# Patient Record
Sex: FEMALE | Race: WHITE | NOT HISPANIC OR LATINO | Employment: FULL TIME | URBAN - METROPOLITAN AREA
[De-identification: names, ages, dates, MRNs, and addresses within clinical notes are randomized per-mention and may not be internally consistent; named-entity substitution may affect disease eponyms.]

---

## 2023-07-21 ENCOUNTER — APPOINTMENT (OUTPATIENT)
Dept: CT IMAGING | Facility: HOSPITAL | Age: 28
End: 2023-07-21
Payer: COMMERCIAL

## 2023-07-21 ENCOUNTER — APPOINTMENT (OUTPATIENT)
Dept: GENERAL RADIOLOGY | Facility: HOSPITAL | Age: 28
End: 2023-07-21
Payer: COMMERCIAL

## 2023-07-21 ENCOUNTER — HOSPITAL ENCOUNTER (OUTPATIENT)
Facility: HOSPITAL | Age: 28
Discharge: LEFT BEFORE DISCHARGE | End: 2023-07-22
Attending: EMERGENCY MEDICINE | Admitting: INTERNAL MEDICINE
Payer: COMMERCIAL

## 2023-07-21 DIAGNOSIS — R00.0 SINUS TACHYCARDIA: ICD-10-CM

## 2023-07-21 DIAGNOSIS — R41.0 ACUTE DELIRIUM: ICD-10-CM

## 2023-07-21 DIAGNOSIS — T50.904A POLYSUBSTANCE OVERDOSE, UNDETERMINED INTENT, INITIAL ENCOUNTER: Primary | ICD-10-CM

## 2023-07-21 PROBLEM — T50.901A DRUG OVERDOSE: Status: ACTIVE | Noted: 2023-07-21

## 2023-07-21 PROBLEM — D72.829 LEUKOCYTOSIS: Status: ACTIVE | Noted: 2023-07-21

## 2023-07-21 PROBLEM — F19.10 POLYSUBSTANCE ABUSE: Status: ACTIVE | Noted: 2023-07-21

## 2023-07-21 PROBLEM — R41.82 AMS (ALTERED MENTAL STATUS): Status: ACTIVE | Noted: 2023-07-21

## 2023-07-21 PROBLEM — F43.10 PTSD (POST-TRAUMATIC STRESS DISORDER): Status: ACTIVE | Noted: 2023-07-21

## 2023-07-21 PROBLEM — F41.9 ANXIETY: Status: ACTIVE | Noted: 2023-07-21

## 2023-07-21 PROBLEM — E87.29 METABOLIC ACIDOSIS, INCREASED ANION GAP: Status: ACTIVE | Noted: 2023-07-21

## 2023-07-21 PROBLEM — E83.52 HYPERCALCEMIA: Status: ACTIVE | Noted: 2023-07-21

## 2023-07-21 LAB
ALBUMIN SERPL-MCNC: 5.8 G/DL (ref 3.5–5.2)
ALBUMIN/GLOB SERPL: 2 G/DL
ALP SERPL-CCNC: 95 U/L (ref 39–117)
ALT SERPL W P-5'-P-CCNC: 28 U/L (ref 1–33)
AMPHET+METHAMPHET UR QL: NEGATIVE
ANION GAP SERPL CALCULATED.3IONS-SCNC: 14.9 MMOL/L (ref 5–15)
ANION GAP SERPL CALCULATED.3IONS-SCNC: 22 MMOL/L (ref 5–15)
APAP SERPL-MCNC: <5 MCG/ML (ref 0–30)
AST SERPL-CCNC: 41 U/L (ref 1–32)
BARBITURATES UR QL SCN: NEGATIVE
BASOPHILS # BLD AUTO: 0.04 10*3/MM3 (ref 0–0.2)
BASOPHILS NFR BLD AUTO: 0.4 % (ref 0–1.5)
BENZODIAZ UR QL SCN: NEGATIVE
BILIRUB SERPL-MCNC: 0.4 MG/DL (ref 0–1.2)
BUN SERPL-MCNC: 14 MG/DL (ref 6–20)
BUN SERPL-MCNC: 16 MG/DL (ref 6–20)
BUN/CREAT SERPL: 17.1 (ref 7–25)
BUN/CREAT SERPL: 22.5 (ref 7–25)
CALCIUM SPEC-SCNC: 10.2 MG/DL (ref 8.6–10.5)
CALCIUM SPEC-SCNC: 11.5 MG/DL (ref 8.6–10.5)
CANNABINOIDS SERPL QL: NEGATIVE
CHLORIDE SERPL-SCNC: 103 MMOL/L (ref 98–107)
CHLORIDE SERPL-SCNC: 99 MMOL/L (ref 98–107)
CO2 SERPL-SCNC: 20 MMOL/L (ref 22–29)
CO2 SERPL-SCNC: 24.1 MMOL/L (ref 22–29)
COCAINE UR QL: POSITIVE
CREAT SERPL-MCNC: 0.71 MG/DL (ref 0.57–1)
CREAT SERPL-MCNC: 0.82 MG/DL (ref 0.57–1)
DEPRECATED RDW RBC AUTO: 39.6 FL (ref 37–54)
DEPRECATED RDW RBC AUTO: 42.9 FL (ref 37–54)
EGFRCR SERPLBLD CKD-EPI 2021: 101.3 ML/MIN/1.73
EGFRCR SERPLBLD CKD-EPI 2021: 120.4 ML/MIN/1.73
EOSINOPHIL # BLD AUTO: 0.17 10*3/MM3 (ref 0–0.4)
EOSINOPHIL NFR BLD AUTO: 1.6 % (ref 0.3–6.2)
ERYTHROCYTE [DISTWIDTH] IN BLOOD BY AUTOMATED COUNT: 12.9 % (ref 12.3–15.4)
ERYTHROCYTE [DISTWIDTH] IN BLOOD BY AUTOMATED COUNT: 13.2 % (ref 12.3–15.4)
ETHANOL BLD-MCNC: <10 MG/DL (ref 0–10)
ETHANOL UR QL: <0.01 %
FENTANYL UR-MCNC: POSITIVE NG/ML
GLOBULIN UR ELPH-MCNC: 2.9 GM/DL
GLUCOSE SERPL-MCNC: 123 MG/DL (ref 65–99)
GLUCOSE SERPL-MCNC: 96 MG/DL (ref 65–99)
HCG SERPL QL: NEGATIVE
HCT VFR BLD AUTO: 42.8 % (ref 34–46.6)
HCT VFR BLD AUTO: 45.6 % (ref 34–46.6)
HGB BLD-MCNC: 14.3 G/DL (ref 12–15.9)
HGB BLD-MCNC: 15.3 G/DL (ref 12–15.9)
IMM GRANULOCYTES # BLD AUTO: 0.01 10*3/MM3 (ref 0–0.05)
IMM GRANULOCYTES NFR BLD AUTO: 0.1 % (ref 0–0.5)
LYMPHOCYTES # BLD AUTO: 3.52 10*3/MM3 (ref 0.7–3.1)
LYMPHOCYTES NFR BLD AUTO: 34.1 % (ref 19.6–45.3)
MCH RBC QN AUTO: 28.5 PG (ref 26.6–33)
MCH RBC QN AUTO: 29.3 PG (ref 26.6–33)
MCHC RBC AUTO-ENTMCNC: 33.4 G/DL (ref 31.5–35.7)
MCHC RBC AUTO-ENTMCNC: 33.6 G/DL (ref 31.5–35.7)
MCV RBC AUTO: 85.3 FL (ref 79–97)
MCV RBC AUTO: 87.4 FL (ref 79–97)
METHADONE UR QL SCN: NEGATIVE
MONOCYTES # BLD AUTO: 0.82 10*3/MM3 (ref 0.1–0.9)
MONOCYTES NFR BLD AUTO: 8 % (ref 5–12)
NEUTROPHILS NFR BLD AUTO: 5.75 10*3/MM3 (ref 1.7–7)
NEUTROPHILS NFR BLD AUTO: 55.8 % (ref 42.7–76)
NRBC BLD AUTO-RTO: 0 /100 WBC (ref 0–0.2)
OPIATES UR QL: NEGATIVE
OXYCODONE UR QL SCN: NEGATIVE
PLATELET # BLD AUTO: 330 10*3/MM3 (ref 140–450)
PLATELET # BLD AUTO: 391 10*3/MM3 (ref 140–450)
PMV BLD AUTO: 8.9 FL (ref 6–12)
PMV BLD AUTO: 9 FL (ref 6–12)
POTASSIUM SERPL-SCNC: 3.6 MMOL/L (ref 3.5–5.2)
POTASSIUM SERPL-SCNC: 3.8 MMOL/L (ref 3.5–5.2)
PROCALCITONIN SERPL-MCNC: 0.2 NG/ML (ref 0–0.25)
PROT SERPL-MCNC: 8.7 G/DL (ref 6–8.5)
QT INTERVAL: 317 MS
RBC # BLD AUTO: 5.02 10*6/MM3 (ref 3.77–5.28)
RBC # BLD AUTO: 5.22 10*6/MM3 (ref 3.77–5.28)
SALICYLATES SERPL-MCNC: <0.3 MG/DL
SODIUM SERPL-SCNC: 141 MMOL/L (ref 136–145)
SODIUM SERPL-SCNC: 142 MMOL/L (ref 136–145)
WBC NRBC COR # BLD: 10.31 10*3/MM3 (ref 3.4–10.8)
WBC NRBC COR # BLD: 15.04 10*3/MM3 (ref 3.4–10.8)

## 2023-07-21 PROCEDURE — 25010000002 LORAZEPAM PER 2 MG: Performed by: EMERGENCY MEDICINE

## 2023-07-21 PROCEDURE — 84703 CHORIONIC GONADOTROPIN ASSAY: CPT | Performed by: EMERGENCY MEDICINE

## 2023-07-21 PROCEDURE — 96361 HYDRATE IV INFUSION ADD-ON: CPT

## 2023-07-21 PROCEDURE — 93005 ELECTROCARDIOGRAM TRACING: CPT | Performed by: NURSE PRACTITIONER

## 2023-07-21 PROCEDURE — 80307 DRUG TEST PRSMV CHEM ANLYZR: CPT | Performed by: EMERGENCY MEDICINE

## 2023-07-21 PROCEDURE — 71045 X-RAY EXAM CHEST 1 VIEW: CPT

## 2023-07-21 PROCEDURE — 96376 TX/PRO/DX INJ SAME DRUG ADON: CPT

## 2023-07-21 PROCEDURE — 70450 CT HEAD/BRAIN W/O DYE: CPT

## 2023-07-21 PROCEDURE — 96375 TX/PRO/DX INJ NEW DRUG ADDON: CPT

## 2023-07-21 PROCEDURE — 85025 COMPLETE CBC W/AUTO DIFF WBC: CPT | Performed by: EMERGENCY MEDICINE

## 2023-07-21 PROCEDURE — 36415 COLL VENOUS BLD VENIPUNCTURE: CPT | Performed by: NURSE PRACTITIONER

## 2023-07-21 PROCEDURE — 25010000002 ONDANSETRON PER 1 MG: Performed by: EMERGENCY MEDICINE

## 2023-07-21 PROCEDURE — 80053 COMPREHEN METABOLIC PANEL: CPT | Performed by: EMERGENCY MEDICINE

## 2023-07-21 PROCEDURE — 63710000001 ONDANSETRON PER 8 MG: Performed by: NURSE PRACTITIONER

## 2023-07-21 PROCEDURE — 96374 THER/PROPH/DIAG INJ IV PUSH: CPT

## 2023-07-21 PROCEDURE — 25010000002 DROPERIDOL PER 5 MG: Performed by: EMERGENCY MEDICINE

## 2023-07-21 PROCEDURE — 80143 DRUG ASSAY ACETAMINOPHEN: CPT | Performed by: EMERGENCY MEDICINE

## 2023-07-21 PROCEDURE — 84145 PROCALCITONIN (PCT): CPT | Performed by: NURSE PRACTITIONER

## 2023-07-21 PROCEDURE — 85027 COMPLETE CBC AUTOMATED: CPT | Performed by: NURSE PRACTITIONER

## 2023-07-21 PROCEDURE — 99285 EMERGENCY DEPT VISIT HI MDM: CPT

## 2023-07-21 PROCEDURE — 80179 DRUG ASSAY SALICYLATE: CPT | Performed by: EMERGENCY MEDICINE

## 2023-07-21 PROCEDURE — 82077 ASSAY SPEC XCP UR&BREATH IA: CPT | Performed by: EMERGENCY MEDICINE

## 2023-07-21 PROCEDURE — 93010 ELECTROCARDIOGRAM REPORT: CPT | Performed by: INTERNAL MEDICINE

## 2023-07-21 RX ORDER — ONDANSETRON 4 MG/1
4 TABLET, FILM COATED ORAL EVERY 6 HOURS PRN
Status: DISCONTINUED | OUTPATIENT
Start: 2023-07-21 | End: 2023-07-22 | Stop reason: HOSPADM

## 2023-07-21 RX ORDER — ONDANSETRON 2 MG/ML
4 INJECTION INTRAMUSCULAR; INTRAVENOUS ONCE
Status: COMPLETED | OUTPATIENT
Start: 2023-07-21 | End: 2023-07-21

## 2023-07-21 RX ORDER — NITROGLYCERIN 0.4 MG/1
0.4 TABLET SUBLINGUAL
Status: DISCONTINUED | OUTPATIENT
Start: 2023-07-21 | End: 2023-07-22 | Stop reason: HOSPADM

## 2023-07-21 RX ORDER — CLONIDINE HYDROCHLORIDE 0.1 MG/1
0.1 TABLET ORAL 2 TIMES DAILY PRN
Status: DISCONTINUED | OUTPATIENT
Start: 2023-07-24 | End: 2023-07-22 | Stop reason: HOSPADM

## 2023-07-21 RX ORDER — SODIUM CHLORIDE 9 MG/ML
100 INJECTION, SOLUTION INTRAVENOUS CONTINUOUS
Status: DISCONTINUED | OUTPATIENT
Start: 2023-07-21 | End: 2023-07-22

## 2023-07-21 RX ORDER — CLONIDINE HYDROCHLORIDE 0.1 MG/1
0.1 TABLET ORAL 4 TIMES DAILY PRN
Status: ACTIVE | OUTPATIENT
Start: 2023-07-21 | End: 2023-07-22

## 2023-07-21 RX ORDER — CLONIDINE HYDROCHLORIDE 0.1 MG/1
0.1 TABLET ORAL ONCE AS NEEDED
Status: DISCONTINUED | OUTPATIENT
Start: 2023-07-25 | End: 2023-07-22 | Stop reason: HOSPADM

## 2023-07-21 RX ORDER — DROPERIDOL 2.5 MG/ML
5 INJECTION, SOLUTION INTRAMUSCULAR; INTRAVENOUS ONCE
Status: COMPLETED | OUTPATIENT
Start: 2023-07-21 | End: 2023-07-21

## 2023-07-21 RX ORDER — SODIUM CHLORIDE 0.9 % (FLUSH) 0.9 %
10 SYRINGE (ML) INJECTION EVERY 12 HOURS SCHEDULED
Status: DISCONTINUED | OUTPATIENT
Start: 2023-07-21 | End: 2023-07-22 | Stop reason: HOSPADM

## 2023-07-21 RX ORDER — CALCIUM CARBONATE 500 MG/1
2 TABLET, CHEWABLE ORAL 2 TIMES DAILY PRN
Status: DISCONTINUED | OUTPATIENT
Start: 2023-07-21 | End: 2023-07-22 | Stop reason: HOSPADM

## 2023-07-21 RX ORDER — CLONIDINE HYDROCHLORIDE 0.1 MG/1
0.1 TABLET ORAL 4 TIMES DAILY PRN
Status: DISCONTINUED | OUTPATIENT
Start: 2023-07-22 | End: 2023-07-22 | Stop reason: HOSPADM

## 2023-07-21 RX ORDER — HYDROXYZINE HYDROCHLORIDE 25 MG/1
50 TABLET, FILM COATED ORAL 3 TIMES DAILY PRN
Status: DISCONTINUED | OUTPATIENT
Start: 2023-07-21 | End: 2023-07-22 | Stop reason: HOSPADM

## 2023-07-21 RX ORDER — SODIUM CHLORIDE 9 MG/ML
40 INJECTION, SOLUTION INTRAVENOUS AS NEEDED
Status: DISCONTINUED | OUTPATIENT
Start: 2023-07-21 | End: 2023-07-22 | Stop reason: HOSPADM

## 2023-07-21 RX ORDER — SODIUM CHLORIDE 0.9 % (FLUSH) 0.9 %
10 SYRINGE (ML) INJECTION AS NEEDED
Status: DISCONTINUED | OUTPATIENT
Start: 2023-07-21 | End: 2023-07-22 | Stop reason: HOSPADM

## 2023-07-21 RX ORDER — ONDANSETRON 2 MG/ML
4 INJECTION INTRAMUSCULAR; INTRAVENOUS EVERY 6 HOURS PRN
Status: DISCONTINUED | OUTPATIENT
Start: 2023-07-21 | End: 2023-07-22 | Stop reason: HOSPADM

## 2023-07-21 RX ORDER — LORAZEPAM 2 MG/ML
2 INJECTION INTRAMUSCULAR ONCE
Status: COMPLETED | OUTPATIENT
Start: 2023-07-21 | End: 2023-07-21

## 2023-07-21 RX ORDER — LORAZEPAM 2 MG/ML
1 INJECTION INTRAMUSCULAR ONCE
Status: COMPLETED | OUTPATIENT
Start: 2023-07-21 | End: 2023-07-21

## 2023-07-21 RX ORDER — CLONIDINE HYDROCHLORIDE 0.1 MG/1
0.1 TABLET ORAL 3 TIMES DAILY PRN
Status: DISCONTINUED | OUTPATIENT
Start: 2023-07-23 | End: 2023-07-22 | Stop reason: HOSPADM

## 2023-07-21 RX ADMIN — SODIUM CHLORIDE 100 ML/HR: 9 INJECTION, SOLUTION INTRAVENOUS at 05:44

## 2023-07-21 RX ADMIN — LORAZEPAM 1 MG: 2 INJECTION INTRAMUSCULAR; INTRAVENOUS at 05:41

## 2023-07-21 RX ADMIN — SODIUM CHLORIDE 100 ML/HR: 9 INJECTION, SOLUTION INTRAVENOUS at 05:41

## 2023-07-21 RX ADMIN — DROPERIDOL 5 MG: 2.5 INJECTION, SOLUTION INTRAMUSCULAR; INTRAVENOUS at 04:37

## 2023-07-21 RX ADMIN — HYDROXYZINE HYDROCHLORIDE 50 MG: 25 TABLET ORAL at 23:27

## 2023-07-21 RX ADMIN — Medication 10 ML: at 20:15

## 2023-07-21 RX ADMIN — LORAZEPAM 2 MG: 2 INJECTION INTRAMUSCULAR; INTRAVENOUS at 03:49

## 2023-07-21 RX ADMIN — SODIUM CHLORIDE 1000 ML: 9 INJECTION, SOLUTION INTRAVENOUS at 03:51

## 2023-07-21 RX ADMIN — HYDROXYZINE HYDROCHLORIDE 50 MG: 25 TABLET ORAL at 08:33

## 2023-07-21 RX ADMIN — ONDANSETRON HYDROCHLORIDE 4 MG: 4 TABLET, FILM COATED ORAL at 08:33

## 2023-07-21 RX ADMIN — SODIUM CHLORIDE 100 ML/HR: 9 INJECTION, SOLUTION INTRAVENOUS at 12:15

## 2023-07-21 RX ADMIN — ONDANSETRON 4 MG: 2 INJECTION INTRAMUSCULAR; INTRAVENOUS at 05:40

## 2023-07-21 NOTE — NURSING NOTE
[facility] attempted evaluation with patient. Patient has been sleeping/sedated throughout the day. Patient was agitated upon ED arrival and was medicated with Ativan and droperidol. On latest attempt the patient did not wake up to name. [facility] will attempt evaluation tomorrow.

## 2023-07-21 NOTE — ED NOTES
"Nursing report ED to floor  Nereida Thomas  27 y.o.  female    HPI :   Chief Complaint   Patient presents with    Altered Mental Status       Admitting doctor:   Dany Cain MD    Admitting diagnosis:   The primary encounter diagnosis was Polysubstance overdose, undetermined intent, initial encounter. Diagnoses of Acute delirium and Sinus tachycardia were also pertinent to this visit.    Code status:   Current Code Status       Date Active Code Status Order ID Comments User Context       7/21/2023 0506 CPR (Attempt to Resuscitate) [id number]  Jaylin Massey APRN ED        Question Answer    Code Status (Patient has no pulse and is not breathing) CPR (Attempt to Resuscitate)    Medical Interventions (Patient has pulse or is breathing) Full Support    Release to patient Routine Release                    Allergies:   Patient has no known allergies.    Isolation:   No active isolations    Intake and Output  No intake or output data in the 24 hours ending 07/21/23 0549    Weight:       07/21/23  0402   Weight: 52.2 kg (115 lb)       Most recent vitals:   Vitals:    07/21/23 0402 07/21/23 0451 07/21/23 0530 07/21/23 0533   BP:  (!) 101/36 (!) 134/110    Pulse:   94 100   Resp: 24      Temp: 99.4 °F (37.4 °C)      TempSrc: Tympanic      SpO2:   (!) 88% 97%   Weight: 52.2 kg (115 lb)      Height: 160 cm (63\")          Active LDAs/IV Access:   Lines, Drains & Airways       Active LDAs       Name Placement date Placement time Site Days    Peripheral IV 07/21/23 0346 Anterior;Left;Proximal Antecubital 07/21/23 0346  Antecubital  less than 1                    Labs (abnormal labs have a star):   Labs Reviewed   COMPREHENSIVE METABOLIC PANEL - Abnormal; Notable for the following components:       Result Value    CO2 20.0 (*)     Calcium 11.5 (*)     Total Protein 8.7 (*)     Albumin 5.8 (*)     AST (SGOT) 41 (*)     Anion Gap 22.0 (*)     All other components within normal limits    Narrative:     GFR Normal " >60  Chronic Kidney Disease <60  Kidney Failure <15     URINE DRUG SCREEN - Abnormal; Notable for the following components:    Cocaine Screen, Urine Positive (*)     Fentanyl, Urine Positive (*)     All other components within normal limits    Narrative:     Negative Thresholds Per Drugs Screened:    Amphetamines                 500 ng/ml  Barbiturates                 200 ng/ml  Benzodiazepines              100 ng/ml  Cocaine                      300 ng/ml  Methadone                    300 ng/ml  Opiates                      300 ng/ml  Oxycodone                    100 ng/ml  THC                           50 ng/ml  Fentanyl                       5 ng/ml      The Normal Value for all drugs tested is negative. This report includes final unconfirmed screening results to be used for medical treatment purposes only. Unconfirmed results must not be used for non-medical purposes such as employment or legal testing. Clinical consideration should be applied to any drug of abuse test, particularly when unconfirmed results are used.           CBC WITH AUTO DIFFERENTIAL - Abnormal; Notable for the following components:    Lymphocytes, Absolute 3.52 (*)     All other components within normal limits   HCG, SERUM, QUALITATIVE - Normal   ACETAMINOPHEN LEVEL - Normal   SALICYLATE LEVEL - Normal    Narrative:     Therapeutic range for Salicylates:  3.0 - 10.0 mg/dL for antipyretic/analgesic conditions  15.0 - 30.0 mg/dL for anti-inflammatory conditions   ETHANOL   BASIC METABOLIC PANEL   CBC (NO DIFF)   CBC AND DIFFERENTIAL    Narrative:     The following orders were created for panel order CBC & Differential.  Procedure                               Abnormality         Status                     ---------                               -----------         ------                     CBC Auto Differential[[id number]]        Abnormal            Final result                 Please view results for these tests on the individual orders.        EKG:   No orders to display       Meds given in ED:   Medications   sodium chloride 0.9 % flush 10 mL (has no administration in time range)   sodium chloride 0.9 % flush 10 mL (has no administration in time range)   sodium chloride 0.9 % flush 10 mL (has no administration in time range)   sodium chloride 0.9 % infusion 40 mL (has no administration in time range)   nitroglycerin (NITROSTAT) SL tablet 0.4 mg (has no administration in time range)   sodium chloride 0.9 % infusion (100 mL/hr Intravenous New Bag 7/21/23 0544)   ondansetron (ZOFRAN) tablet 4 mg (has no administration in time range)     Or   ondansetron (ZOFRAN) injection 4 mg (has no administration in time range)   calcium carbonate (TUMS) chewable tablet 500 mg (200 mg elemental) (has no administration in time range)   cloNIDine (CATAPRES) tablet 0.1 mg (has no administration in time range)     Followed by   cloNIDine (CATAPRES) tablet 0.1 mg (has no administration in time range)     Followed by   cloNIDine (CATAPRES) tablet 0.1 mg (has no administration in time range)     Followed by   cloNIDine (CATAPRES) tablet 0.1 mg (has no administration in time range)     Followed by   cloNIDine (CATAPRES) tablet 0.1 mg (has no administration in time range)   hydrOXYzine (ATARAX) tablet 50 mg (has no administration in time range)   sodium chloride 0.9 % bolus 1,000 mL (1,000 mL Intravenous New Bag 7/21/23 0351)   LORazepam (ATIVAN) injection 2 mg (2 mg Intravenous Given 7/21/23 0349)   droperidol (INAPSINE) injection 5 mg (5 mg Intravenous Given 7/21/23 0437)   LORazepam (ATIVAN) injection 1 mg (1 mg Intravenous Given 7/21/23 0541)   ondansetron (ZOFRAN) injection 4 mg (4 mg Intravenous Given 7/21/23 0540)       Imaging results:  CT Head Without Contrast    Result Date: 7/21/2023  Electronically signed by Ryan Valdovinos MD on 07-21-23 at 0508     Ambulatory status:   - bed rest    Social issues:   Social History     Socioeconomic History    Marital  status: Single       NIH Stroke Scale:       Miguel Bernal RN  07/21/23 05:49 EDT

## 2023-07-21 NOTE — CONSULTS
"Nutrition Services    Patient Name:  Nereida Thomas  YOB: 1995  MRN: [id number]  Admit Date:  7/21/2023    Assessment Date:  07/21/23    Comment: Visited patient at bedside who had arrived to the floor a few hours ago from ED. She was admitted for suspected drug overdose. Most recent COWS-12. She has not been awake enough to attempt to eat. Limited wt hx, but it seems to be stable. Her boyfriend was at bedside. Will continue to follow      CLINICAL NUTRITION ASSESSMENT      Reason for Assessment Nurse Admission Screen     Diagnosis/Problem   AMS, polysubstance abuse, drug overdose, anxiety, PTSD   Medical/Surgical History Past Medical History:   Diagnosis Date    Anxiety     Neck pain        Past Surgical History:   Procedure Laterality Date    NO PAST SURGERIES          Encounter Information        Nutrition History:     Food Preferences:    Supplements:    Factors Affecting Intake: altered mental status     Anthropometrics        Current Height  Current Weight  BMI kg/m2 Height: 160 cm (63\")  Weight: 52.2 kg (115 lb) (07/21/23 0402)  Body mass index is 20.37 kg/m².   Adjusted BMI (if applicable)    BMI Category Normal/Healthy (18.4 - 24.9)       Admission Weight 115# (52.2 kg)        Ideal Body Weight (IBW) 115#   Adjusted IBW (if applicable)        Usual Body Weight (UBW)    Weight Change/Trend Stable       Weight History Wt Readings from Last 30 Encounters:   07/21/23 0402 52.2 kg (115 lb)           --  Estimated/Assessed Needs        Current Weight  Weight: 52.2 kg (115 lb) (07/21/23 0402)       Energy Requirements    Weight for Calculation 115# (52.2 kg)    Method for Estimation  25 kcal/kg, 30 kcal/kg   EST Needs (kcal/day) 6972-1095       Protein Requirements    Weight for Calculation 115# (52.2 kg)    EST Protein Needs (g/kg) 1.0 - 1.2 gm/kg   EST Daily Needs (g/day) 52-63       Fluid Requirements     Method for Estimation 1 mL/kcal    EST Needs (mL/day)      Tests/Procedures        " Tests/Procedures CT scan, X-Ray     Labs       Pertinent Labs    Results from last 7 days   Lab Units 07/21/23  0826 07/21/23  0346   SODIUM mmol/L 142 141   POTASSIUM mmol/L 3.6 3.8   CHLORIDE mmol/L 103 99   CO2 mmol/L 24.1 20.0*   BUN mg/dL 16 14   CREATININE mg/dL 0.71 0.82   CALCIUM mg/dL 10.2 11.5*   BILIRUBIN mg/dL  --  0.4   ALK PHOS U/L  --  95   ALT (SGPT) U/L  --  28   AST (SGOT) U/L  --  41*   GLUCOSE mg/dL 123* 96     Results from last 7 days   Lab Units 07/21/23  0826 07/21/23  0346   HEMOGLOBIN g/dL 14.3 15.3   HEMATOCRIT % 42.8 45.6   WBC 10*3/mm3 15.04* 10.31   ALBUMIN g/dL  --  5.8*     Results from last 7 days   Lab Units 07/21/23  0826 07/21/23  0346   PLATELETS 10*3/mm3 330 391     No results found for: COVID19  No results found for: HGBA1C       Medications           Scheduled Medications sodium chloride, 10 mL, Intravenous, Q12H       Infusions sodium chloride, 100 mL/hr, Last Rate: 100 mL/hr (07/21/23 1215)       PRN Medications   calcium carbonate    cloNIDine **FOLLOWED BY** [START ON 7/22/2023] cloNIDine **FOLLOWED BY** [START ON 7/23/2023] cloNIDine **FOLLOWED BY** [START ON 7/24/2023] cloNIDine **FOLLOWED BY** [START ON 7/25/2023] cloNIDine    hydrOXYzine    nitroglycerin    ondansetron **OR** ondansetron    [COMPLETED] Insert Peripheral IV **AND** sodium chloride    sodium chloride    sodium chloride     Physical Findings          Physical Appearance disoriented, somnolent   Oral/Mouth Cavity WNL   Edema  no edema   Gastrointestinal last bowel movement: UTD   Skin  skin intact   Tubes/Drains/Lines none   NFPE Not indicated at this time   --  Current Nutrition Orders & Evaluation of Intake       Oral Nutrition     Food Allergies NKFA   Current PO Diet Diet: Regular/House Diet; Texture: Regular Texture (IDDSI 7); Fluid Consistency: Thin (IDDSI 0)   Supplement n/a   PO Evaluation     % PO Intake N/a    # of Days Evaluated N/a    --  PES STATEMENT / NUTRITION DIAGNOSIS      Nutrition Dx  Problem  Problem: Predicted Suboptimal Intake  Etiology: Medical Diagnosis AMS, drug overdose  Signs/Symptoms: Report/Observation    Comment:    --  NUTRITION INTERVENTION / PLAN OF CARE      Intervention Goal(s) Reduce/improve symptoms, Meet estimated needs, Tolerate PO , Increase intake, and Maintain weight         RD Intervention/Action Interview for preferences, Follow Tx Progress, and Care plan reviewed         Prescription/Orders:       PO Diet       Supplements       Snacks       Enteral Nutrition       Parenteral Nutrition    New Prescription Ordered? No changes at this time   --      Monitor/Evaluation Per protocol, I&O, PO intake, Pertinent labs, Weight, Skin status, GI status, Symptoms, POC/GOC   Discharge Plan/Needs No discharge needs identified at this time   Education Will instruct as appropriate   --    RD to follow per protocol.      Electronically signed by:  Malina Guthrie RD  07/21/23 14:54 EDT

## 2023-07-21 NOTE — ED PROVIDER NOTES
EMERGENCY DEPARTMENT ENCOUNTER    Room Number:  15/15  PCP: Provider, No Known  Historian: EMS report      HPI:  Chief Complaint: Drug overdose  A complete HPI/ROS/PMH/PSH/SH/FH are unobtainable due to: Altered mental state  Context: Nereida Thomas is a 27 y.o. female who presents to the ED today following a possible drug overdose.  EMS reports that the patient's boyfriend found her unresponsive on the side of the bed.  She was given intranasal Narcan with resolution in her unresponsive state.  Currently, the patient is highly agitated and having episodes of dry heaves as well as diarrhea.  She is currently not coherent and unable to contribute to the HPI.            PAST MEDICAL HISTORY  Active Ambulatory Problems     Diagnosis Date Noted    No Active Ambulatory Problems     Resolved Ambulatory Problems     Diagnosis Date Noted    No Resolved Ambulatory Problems     No Additional Past Medical History         PAST SURGICAL HISTORY  History reviewed. No pertinent surgical history.      FAMILY HISTORY  History reviewed. No pertinent family history.      SOCIAL HISTORY  Social History     Socioeconomic History    Marital status: Single         ALLERGIES  Patient has no known allergies.        REVIEW OF SYSTEMS  Review of Systems   Unable to perform ROS: Mental status change          PHYSICAL EXAM  ED Triage Vitals [07/21/23 0317]   Temp Heart Rate Resp BP SpO2   -- (!) 130 24 127/75 98 %      Temp src Heart Rate Source Patient Position BP Location FiO2 (%)   -- -- -- -- --       Physical Exam  Constitutional:       General: She is not in acute distress.     Appearance: Normal appearance. She is not ill-appearing or toxic-appearing.   HENT:      Head: Normocephalic and atraumatic.   Eyes:      Extraocular Movements: Extraocular movements intact.      Pupils: Pupils are equal, round, and reactive to light.   Cardiovascular:      Rate and Rhythm: Normal rate and regular rhythm.      Heart sounds: No murmur heard.    No  friction rub. No gallop.   Pulmonary:      Effort: Pulmonary effort is normal.      Breath sounds: Normal breath sounds.   Abdominal:      General: Abdomen is flat. There is no distension.      Palpations: Abdomen is soft.      Tenderness: There is no abdominal tenderness.   Musculoskeletal:         General: No swelling or tenderness. Normal range of motion.      Cervical back: Normal range of motion and neck supple.   Skin:     General: Skin is warm and dry.   Neurological:      General: No focal deficit present.      Mental Status: She is disoriented and confused.      Sensory: No sensory deficit.      Motor: No weakness.   Psychiatric:         Speech: She is noncommunicative.         Behavior: Behavior is agitated.         Vital signs and nursing notes reviewed.          LAB RESULTS  Recent Results (from the past 24 hour(s))   Comprehensive Metabolic Panel    Collection Time: 07/21/23  3:46 AM    Specimen: Blood   Result Value Ref Range    Glucose 96 65 - 99 mg/dL    BUN 14 6 - 20 mg/dL    Creatinine 0.82 0.57 - 1.00 mg/dL    Sodium 141 136 - 145 mmol/L    Potassium 3.8 3.5 - 5.2 mmol/L    Chloride 99 98 - 107 mmol/L    CO2 20.0 (L) 22.0 - 29.0 mmol/L    Calcium 11.5 (H) 8.6 - 10.5 mg/dL    Total Protein 8.7 (H) 6.0 - 8.5 g/dL    Albumin 5.8 (H) 3.5 - 5.2 g/dL    ALT (SGPT) 28 1 - 33 U/L    AST (SGOT) 41 (H) 1 - 32 U/L    Alkaline Phosphatase 95 39 - 117 U/L    Total Bilirubin 0.4 0.0 - 1.2 mg/dL    Globulin 2.9 gm/dL    A/G Ratio 2.0 g/dL    BUN/Creatinine Ratio 17.1 7.0 - 25.0    Anion Gap 22.0 (H) 5.0 - 15.0 mmol/L    eGFR 101.3 >60.0 mL/min/1.73   hCG, Serum, Qualitative    Collection Time: 07/21/23  3:46 AM    Specimen: Blood   Result Value Ref Range    HCG Qualitative Negative Negative   Ethanol    Collection Time: 07/21/23  3:46 AM    Specimen: Blood   Result Value Ref Range    Ethanol <10 0 - 10 mg/dL    Ethanol % <0.010 %   Acetaminophen Level    Collection Time: 07/21/23  3:46 AM    Specimen: Blood    Result Value Ref Range    Acetaminophen <5.0 0.0 - 30.0 mcg/mL   Salicylate Level    Collection Time: 07/21/23  3:46 AM    Specimen: Blood   Result Value Ref Range    Salicylate <0.3 <=30.0 mg/dL   CBC Auto Differential    Collection Time: 07/21/23  3:46 AM    Specimen: Blood   Result Value Ref Range    WBC 10.31 3.40 - 10.80 10*3/mm3    RBC 5.22 3.77 - 5.28 10*6/mm3    Hemoglobin 15.3 12.0 - 15.9 g/dL    Hematocrit 45.6 34.0 - 46.6 %    MCV 87.4 79.0 - 97.0 fL    MCH 29.3 26.6 - 33.0 pg    MCHC 33.6 31.5 - 35.7 g/dL    RDW 13.2 12.3 - 15.4 %    RDW-SD 42.9 37.0 - 54.0 fl    MPV 8.9 6.0 - 12.0 fL    Platelets 391 140 - 450 10*3/mm3    Neutrophil % 55.8 42.7 - 76.0 %    Lymphocyte % 34.1 19.6 - 45.3 %    Monocyte % 8.0 5.0 - 12.0 %    Eosinophil % 1.6 0.3 - 6.2 %    Basophil % 0.4 0.0 - 1.5 %    Immature Grans % 0.1 0.0 - 0.5 %    Neutrophils, Absolute 5.75 1.70 - 7.00 10*3/mm3    Lymphocytes, Absolute 3.52 (H) 0.70 - 3.10 10*3/mm3    Monocytes, Absolute 0.82 0.10 - 0.90 10*3/mm3    Eosinophils, Absolute 0.17 0.00 - 0.40 10*3/mm3    Basophils, Absolute 0.04 0.00 - 0.20 10*3/mm3    Immature Grans, Absolute 0.01 0.00 - 0.05 10*3/mm3    nRBC 0.0 0.0 - 0.2 /100 WBC   Urine Drug Screen - Urine, Clean Catch    Collection Time: 07/21/23  3:47 AM    Specimen: Urine, Clean Catch   Result Value Ref Range    Amphet/Methamphet, Screen Negative Negative    Barbiturates Screen, Urine Negative Negative    Benzodiazepine Screen, Urine Negative Negative    Cocaine Screen, Urine Positive (A) Negative    Opiate Screen Negative Negative    THC, Screen, Urine Negative Negative    Methadone Screen, Urine Negative Negative    Oxycodone Screen, Urine Negative Negative    Fentanyl, Urine Positive (A) Negative       Ordered the above labs and reviewed the results.        RADIOLOGY  CT Head Without Contrast    Result Date: 7/21/2023  Patient: KARO BELTRAN  Time Out: 05:08 Exam(s): CT HEAD Without Contrast EXAM:   CT Head Without Intravenous  Contrast CLINICAL HISTORY:    Reason for exam: Delirium. TECHNIQUE:   Axial computed tomography images of the head brain without intravenous contrast.  CTDI is 55.44 mGy and DLP is 980 mGy-cm.  This CT exam was performed according to the principle of ALARA (As Low As Reasonably Achievable) by using one or more of the following dose reduction techniques: automated exposure control, adjustment of the mA and or kV according to patient size, and or use of iterative reconstruction technique. COMPARISON:   No relevant prior studies available. FINDINGS:   Artifacts:  Motion artifact degrades image quality limits evaluation.   Brain:  There is no evidence of an acute transcortical infarct or acute intracranial hemorrhage.  No significant white matter disease.   Ventricles:  Unremarkable.  No ventriculomegaly.   Bones joints:  Unremarkable.  No acute fracture.   Soft tissues:  Unremarkable.   Sinuses:  Unremarkable as visualized.  No acute sinusitis.   Mastoid air cells:  Unremarkable as visualized.  No mastoid effusion. IMPRESSION:       No acute findings in the head brain.     Electronically signed by Ryan Valdovinos MD on 07-21-23 at 0508     Ordered the above noted radiological studies. Reviewed by me in PACS.            PROCEDURES  Critical Care  Performed by: Nick Corral MD  Authorized by: Nick Corral MD     Critical care provider statement:     Critical care time (minutes):  32    Critical care time was exclusive of:  Separately billable procedures and treating other patients    Critical care was necessary to treat or prevent imminent or life-threatening deterioration of the following conditions:  Toxidrome    Critical care was time spent personally by me on the following activities:  Blood draw for specimens, development of treatment plan with patient or surrogate, discussions with consultants, evaluation of patient's response to treatment, examination of patient, obtaining history from patient  or surrogate, ordering and performing treatments and interventions, ordering and review of laboratory studies, ordering and review of radiographic studies, pulse oximetry and re-evaluation of patient's condition    Care discussed with: admitting provider              MEDICATIONS GIVEN IN ER  Medications   sodium chloride 0.9 % flush 10 mL (has no administration in time range)   sodium chloride 0.9 % flush 10 mL (has no administration in time range)   sodium chloride 0.9 % flush 10 mL (has no administration in time range)   sodium chloride 0.9 % infusion 40 mL (has no administration in time range)   nitroglycerin (NITROSTAT) SL tablet 0.4 mg (has no administration in time range)   sodium chloride 0.9 % infusion (100 mL/hr Intravenous New Bag 7/21/23 4607)   ondansetron (ZOFRAN) tablet 4 mg (has no administration in time range)     Or   ondansetron (ZOFRAN) injection 4 mg (has no administration in time range)   calcium carbonate (TUMS) chewable tablet 500 mg (200 mg elemental) (has no administration in time range)   cloNIDine (CATAPRES) tablet 0.1 mg (has no administration in time range)     Followed by   cloNIDine (CATAPRES) tablet 0.1 mg (has no administration in time range)     Followed by   cloNIDine (CATAPRES) tablet 0.1 mg (has no administration in time range)     Followed by   cloNIDine (CATAPRES) tablet 0.1 mg (has no administration in time range)     Followed by   cloNIDine (CATAPRES) tablet 0.1 mg (has no administration in time range)   hydrOXYzine (ATARAX) tablet 50 mg (has no administration in time range)   sodium chloride 0.9 % bolus 1,000 mL (1,000 mL Intravenous New Bag 7/21/23 9301)   LORazepam (ATIVAN) injection 2 mg (2 mg Intravenous Given 7/21/23 4014)   droperidol (INAPSINE) injection 5 mg (5 mg Intravenous Given 7/21/23 5262)   LORazepam (ATIVAN) injection 1 mg (1 mg Intravenous Given 7/21/23 9141)   ondansetron (ZOFRAN) injection 4 mg (4 mg Intravenous Given 7/21/23 7640)                    MEDICAL DECISION MAKING, PROGRESS, and CONSULTS    All labs have been independently reviewed by me.  All radiology studies have been reviewed by me and I have also reviewed the radiology report.   EKG's independently viewed and interpreted by me.  Discussion below represents my analysis of pertinent findings related to patient's condition, differential diagnosis, treatment plan and final disposition.      Additional sources:  - Discussed/ obtained information from independent historians: History obtained from the EMS report    - External (non-ED) record review: There are no previous inpatient or outpatient records currently available for ED review    - Chronic or social conditions impacting care: History of drug use    - Shared decision making: Admission decision based on shared conversations have between myself as well as the family at bedside.    Case discussed with AMRTA Payne for [facility], who will admit the patient to the hospital today for Dr. Cain.      Orders placed during this visit:  Orders Placed This Encounter   Procedures    Critical Care    CT Head Without Contrast    Comprehensive Metabolic Panel    hCG, Serum, Qualitative    Ethanol    Urine Drug Screen - Urine, Clean Catch    Acetaminophen Level    Salicylate Level    CBC Auto Differential    Basic Metabolic Panel    CBC (No Diff)    Diet: Regular/House Diet; Texture: Regular Texture (IDDSI 7); Fluid Consistency: Thin (IDDSI 0)    Vital Signs    Intake & Output    Weigh Patient    Oral Care    Place Sequential Compression Device    Maintain Sequential Compression Device    Telemetry - Maintain IV Access    Telemetry - Place Orders & Notify Provider of Results When Patient Experiences Acute Chest Pain, Dysrhythmia or Respiratory Distress    May Be Off Telemetry for Tests    Vital Signs - Blood Pressure & Pulse Prior to & 30 Minutes After Each Clonidine Dose    Notify Provider - Clonidine Detox    Code Status and Medical  Interventions:    [facility] (on-call MD unless specified) Details    Insert Peripheral IV    Insert Peripheral IV    Inpatient Admission    CBC & Differential               Differential diagnosis includes but is not limited to:    Differential diagnosis includes but is not limited to drug overdose, intracranial hemorrhage, acute CVA, TIA, sepsis, urinary tract infection, polysubstance abuse, or severe electrolyte disturbance.      Independent interpretation of labs, radiology studies, and discussions with consultants:    CT scan of the head was independently interpreted by myself with my interpretation showing no areas of acute infarct/ischemia, mass effect, or hemorrhage.        ED Course as of 07/21/23 0545   Fri Jul 21, 2023   0408 The patient continues to be highly agitated and having episodes of dry heaving following Ativan administration.  We will treat her with a dose of IV Inapsine and reassess following. [BM]   0435 The patient seems to have calmed quite a bit following the treatment with Inapsine.  We will continue to await the work-up and treat symptomatically. [BM]   0509 The patient continues to be highly agitated and showing acute delirium.  Heart rate remains very high in the 120s to 130s.  She continues to have bouts of diarrhea as well.  We will continue to treat with benzodiazepines as well as antiemetics.  I did inform the family that she did test positive for cocaine and fentanyl and we would be admitting her to the hospital for further treatment.  All questions have been answered and they are in agreement with the plan. [BM]   0511 The patient's presentation, work-up, as well as diagnosis and treatment plan were discussed at length with MARTA Payne for A.  She agrees to admit the patient to the hospital today for Dr. Cain. [BM]      ED Course User Index  [BM] Nick Corral MD             DIAGNOSIS  Final diagnoses:   Polysubstance overdose, undetermined intent, initial encounter    Acute delirium   Sinus tachycardia         DISPOSITION  ADMISSION    Discussed treatment plan and reason for admission with pt/family and admitting physician.  Pt/family voiced understanding of the plan for admission for further testing/treatment as needed.               Latest Documented Vital Signs:  As of 05:45 EDT  BP- (!) 134/110 HR- 100 Temp- 99.4 °F (37.4 °C) (Tympanic) O2 sat- 97%              --    Please note that portions of this were completed with a voice recognition program.       Note Disclaimer: At [facility], we believe that sharing information builds trust and better relationships. You are receiving this note because you are receiving care at [facility] or recently visited. It is possible you will see health information before a provider has talked with you about it. This kind of information can be easy to misunderstand. To help you fully understand what it means for your health, we urge you to discuss this note with your provider.             Nick Corral MD  07/21/23 0513       Nick Corral MD  07/21/23 0546

## 2023-07-21 NOTE — ED TRIAGE NOTES
Pt to triage via ems from home  Ems called for overdose/unresponsive upon arrival pt was responsive after her boyfriend administered narcan. Pt reports she took xanax. Pt had a bowel movement en route. Pt is a/ox4. Pt is restless/anxious in triage.

## 2023-07-21 NOTE — H&P
Patient Name:  Nereida Thomas  YOB: 1995  MRN:  [id number]  Admit Date:  7/21/2023  Patient Care Team:  Provider, No Known as PCP - General      Subjective   History Present Illness     Chief Complaint   Patient presents with    Altered Mental Status     History of Present Illness  Ms. Thomas is a 27 y.o. with a history of anxiety, PTSD and drug abuse that presents to [facility] for being found unresponsive. Per ED notes, EMS was called by the patient's boyfriend after he found the patient down and unresponsive at home. He administered Narcan and the patient was responsive by the time that paramedics arrived. She was brought to the ED where she was having diarrhea as well as anxiety and agitation.    The patient's boyfriend reports the patient has a history of recreational drug use. 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 He states she recently got some Xanax from a co-worker, but has misused some opiates in the past d/t chronic neck and back pain. He states she vapes but does not smoke cigarettes and does not drink. She has apparently occasionally used cocaine, but no heroin or marijuana to his knowledge. He states that he went to bed an hour earlier to her last night, but woke up to her at the end of the bed twitching and minimally responsive. He states he had Narcan at the house so gave this and decided to give some chest compressions, but states she never lost a pulse either. EMS arrived and she was brought to the ED as above. He states she has no history of SI/HI and has never been treated for any drug abuse. He states they have lived together for the last 6 years as her adoptive family resides in [place].    In the ED, she was tachycardic and hypertensive but otherwise hemodynamically stable. Lab work showed an unremarkable CBC with negative ethanol, salicylate and acetaminophen. Renal function was  preserved but anion gap 22 with CO2 20. UDS was positive for cocaine and fentanyl. CT head was negative for acute findings. She was acutely agitated in the ED and required 2 doses of IV Ativan as well as droperidol.     Review of Systems   Unable to perform ROS: Mental status change      Personal History     Past Medical History:   Diagnosis Date    Anxiety     Neck pain      Past Surgical History:   Procedure Laterality Date    NO PAST SURGERIES       Family History   Adopted: Yes     Social History     Vaping Use    Vaping Use: Every day    Substances: Nicotine   Substance Use Topics    Drug use: Yes     Types: Codeine, Benzodiazepines, Other     Comment: prescription opiates     No medications prior to admission.     Allergies:  No Known Allergies    Objective    Objective     Vital Signs  Temp:  [99.1 °F (37.3 °C)-99.4 °F (37.4 °C)] 99.1 °F (37.3 °C)  Heart Rate:  [] 114  Resp:  [24] 24  BP: (101-134)/() 112/79  SpO2:  [88 %-100 %] 100 %  on   ;      Body mass index is 20.37 kg/m².    Physical Exam  Vitals and nursing note reviewed.   Constitutional:       Appearance: She is ill-appearing. She is not toxic-appearing.   HENT:      Head: Normocephalic and atraumatic.      Right Ear: External ear normal.      Left Ear: External ear normal.      Nose: Nose normal.   Cardiovascular:      Rate and Rhythm: Normal rate and regular rhythm.      Pulses: Normal pulses.   Pulmonary:      Effort: Pulmonary effort is normal. No respiratory distress.      Comments: Poor inspiratory effort d/t patient's cooperation. Diminished throughout.  Abdominal:      General: Bowel sounds are normal. There is no distension.      Palpations: Abdomen is soft.      Tenderness: There is no abdominal tenderness.   Musculoskeletal:         General: No swelling. Normal range of motion.      Cervical back: Normal range of motion and neck supple.   Skin:     General: Skin is warm and dry.      Findings: Bruising present.   Neurological:       Comments: Sedated at present. Sleeping. She will not follow commands. Boyfriend states she has said a few words here and there to him.   Psychiatric:      Comments: Withdrawn, minimally interactive     Results Review:  I reviewed the patient's new clinical results.  I reviewed the patient's new imaging results and agree with the interpretation.  I reviewed the patient's other test results and agree with the interpretation  I personally viewed and interpreted the patient's EKG/Telemetry data    Lab Results (last 24 hours)       Procedure Component Value Units Date/Time    CBC & Differential [[id number]]  (Abnormal) Collected: 07/21/23 0346    Specimen: Blood Updated: 07/21/23 0403    Narrative:      The following orders were created for panel order CBC & Differential.  Procedure                               Abnormality         Status                     ---------                               -----------         ------                     CBC Auto Differential[[id number]]        Abnormal            Final result                 Please view results for these tests on the individual orders.    Comprehensive Metabolic Panel [[id number]]  (Abnormal) Collected: 07/21/23 0346    Specimen: Blood Updated: 07/21/23 0436     Glucose 96 mg/dL      BUN 14 mg/dL      Creatinine 0.82 mg/dL      Sodium 141 mmol/L      Potassium 3.8 mmol/L      Chloride 99 mmol/L      CO2 20.0 mmol/L      Calcium 11.5 mg/dL      Total Protein 8.7 g/dL      Albumin 5.8 g/dL      ALT (SGPT) 28 U/L      AST (SGOT) 41 U/L      Alkaline Phosphatase 95 U/L      Total Bilirubin 0.4 mg/dL      Globulin 2.9 gm/dL      A/G Ratio 2.0 g/dL      BUN/Creatinine Ratio 17.1     Anion Gap 22.0 mmol/L      eGFR 101.3 mL/min/1.73     Narrative:      GFR Normal >60  Chronic Kidney Disease <60  Kidney Failure <15      hCG, Serum, Qualitative [[id number]]  (Normal) Collected: 07/21/23 0346    Specimen: Blood Updated: 07/21/23 0439     HCG Qualitative Negative    Ethanol  [[id number]] Collected: 07/21/23 0346    Specimen: Blood Updated: 07/21/23 0436     Ethanol <10 mg/dL      Ethanol % <0.010 %     Acetaminophen Level [[id number]]  (Normal) Collected: 07/21/23 0346    Specimen: Blood Updated: 07/21/23 0436     Acetaminophen <5.0 mcg/mL     Salicylate Level [[id number]]  (Normal) Collected: 07/21/23 0346    Specimen: Blood Updated: 07/21/23 0436     Salicylate <0.3 mg/dL     Narrative:      Therapeutic range for Salicylates:  3.0 - 10.0 mg/dL for antipyretic/analgesic conditions  15.0 - 30.0 mg/dL for anti-inflammatory conditions    CBC Auto Differential [[id number]]  (Abnormal) Collected: 07/21/23 0346    Specimen: Blood Updated: 07/21/23 0403     WBC 10.31 10*3/mm3      RBC 5.22 10*6/mm3      Hemoglobin 15.3 g/dL      Hematocrit 45.6 %      MCV 87.4 fL      MCH 29.3 pg      MCHC 33.6 g/dL      RDW 13.2 %      RDW-SD 42.9 fl      MPV 8.9 fL      Platelets 391 10*3/mm3      Neutrophil % 55.8 %      Lymphocyte % 34.1 %      Monocyte % 8.0 %      Eosinophil % 1.6 %      Basophil % 0.4 %      Immature Grans % 0.1 %      Neutrophils, Absolute 5.75 10*3/mm3      Lymphocytes, Absolute 3.52 10*3/mm3      Monocytes, Absolute 0.82 10*3/mm3      Eosinophils, Absolute 0.17 10*3/mm3      Basophils, Absolute 0.04 10*3/mm3      Immature Grans, Absolute 0.01 10*3/mm3      nRBC 0.0 /100 WBC     Urine Drug Screen - Urine, Clean Catch [[id number]]  (Abnormal) Collected: 07/21/23 0347    Specimen: Urine, Clean Catch Updated: 07/21/23 0438     Amphet/Methamphet, Screen Negative     Barbiturates Screen, Urine Negative     Benzodiazepine Screen, Urine Negative     Cocaine Screen, Urine Positive     Opiate Screen Negative     THC, Screen, Urine Negative     Methadone Screen, Urine Negative     Oxycodone Screen, Urine Negative     Fentanyl, Urine Positive    Narrative:      Negative Thresholds Per Drugs Screened:    Amphetamines                 500 ng/ml  Barbiturates                 200 ng/ml  Benzodiazepines               100 ng/ml  Cocaine                      300 ng/ml  Methadone                    300 ng/ml  Opiates                      300 ng/ml  Oxycodone                    100 ng/ml  THC                           50 ng/ml  Fentanyl                       5 ng/ml      The Normal Value for all drugs tested is negative. This report includes final unconfirmed screening results to be used for medical treatment purposes only. Unconfirmed results must not be used for non-medical purposes such as employment or legal testing. Clinical consideration should be applied to any drug of abuse test, particularly when unconfirmed results are used.            Basic Metabolic Panel [[id number]]  (Abnormal) Collected: 07/21/23 0826    Specimen: Blood Updated: 07/21/23 0956     Glucose 123 mg/dL      BUN 16 mg/dL      Creatinine 0.71 mg/dL      Sodium 142 mmol/L      Potassium 3.6 mmol/L      Chloride 103 mmol/L      CO2 24.1 mmol/L      Calcium 10.2 mg/dL      BUN/Creatinine Ratio 22.5     Anion Gap 14.9 mmol/L      eGFR 120.4 mL/min/1.73     Narrative:      GFR Normal >60  Chronic Kidney Disease <60  Kidney Failure <15      CBC (No Diff) [[id number]]  (Abnormal) Collected: 07/21/23 0826    Specimen: Blood Updated: 07/21/23 0933     WBC 15.04 10*3/mm3      RBC 5.02 10*6/mm3      Hemoglobin 14.3 g/dL      Hematocrit 42.8 %      MCV 85.3 fL      MCH 28.5 pg      MCHC 33.4 g/dL      RDW 12.9 %      RDW-SD 39.6 fl      MPV 9.0 fL      Platelets 330 10*3/mm3             Imaging Results (Last 24 Hours)       Procedure Component Value Units Date/Time    CT Head Without Contrast [[id number]] Collected: 07/21/23 0509     Updated: 07/21/23 0509    Narrative:        Patient: KARO BELTRAN  Time Out: 05:08  Exam(s): CT HEAD Without Contrast     EXAM:    CT Head Without Intravenous Contrast    CLINICAL HISTORY:     Reason for exam: Delirium.    TECHNIQUE:    Axial computed tomography images of the head brain without intravenous   contrast.  CTDI is  55.44 mGy and DLP is 980 mGy-cm.  This CT exam was   performed according to the principle of ALARA (As Low As Reasonably   Achievable) by using one or more of the following dose reduction   techniques: automated exposure control, adjustment of the mA and or kV   according to patient size, and or use of iterative reconstruction   technique.    COMPARISON:    No relevant prior studies available.    FINDINGS:    Artifacts:  Motion artifact degrades image quality limits evaluation.    Brain:  There is no evidence of an acute transcortical infarct or acute   intracranial hemorrhage.  No significant white matter disease.    Ventricles:  Unremarkable.  No ventriculomegaly.    Bones joints:  Unremarkable.  No acute fracture.    Soft tissues:  Unremarkable.    Sinuses:  Unremarkable as visualized.  No acute sinusitis.    Mastoid air cells:  Unremarkable as visualized.  No mastoid effusion.    IMPRESSION:         No acute findings in the head brain.      Impression:          Electronically signed by Ryan Valdovinos MD on 07-21-23 at 0508             Assessment/Plan     Active Hospital Problems    Diagnosis  POA    **AMS (altered mental status) [R41.82]  Yes    Polysubstance abuse [F19.10]  Yes    Drug overdose [T50.901A]  Yes    Anxiety [F41.9]  Yes    PTSD (post-traumatic stress disorder) [F43.10]  Yes    Metabolic acidosis, increased anion gap [E87.29]  Yes    Hypercalcemia [E83.52]  Yes    Leukocytosis [D72.829]  Unknown     Ms. Thomas is a 27 year old female who presented to the ED after being found unresponsive at home. She has a history of recreational drug use with cocaine as well as prior misuse of prescription opiates. Her boyfriend administered Narcan with improvement but she had confusion/agitation and diarrhea while in the ED. Her drug screen was positive for cocaine and fentanyl on arrival. Boyfriend recently reports her getting some Xanax from a friend at work.    AMS   Drug overdose  -Agitation/confusion  secondary to drugs with reversal.   -Given PRN Ativan in the ED. She is sedated at this time. Will monitor for now.  -COWS protocol initiated.   -Access to see and will ask Psychiatry to see as well.  -Boyfriend denies any prior history of SI/HI, but patient not participating in interview at this time. Will start suicide precautions and have 1:1 monitoring until psych and/or access can evaluate.    Polysubstance abuse  -UDS positive for cocaine and fentanyl.  -She will use benzos when she can get them and has a history of using prescription pain killers as well.  -Access to see as above.    Metabolic acidosis  -Improved with fluids. AG closed. Continue IVF for now.    Leukocytosis  -On repeat labs this AM. Likely reactive but with AMS cannot rule out aspiration.  -Check baseline CXR and add procalcitonin. Monitor for any fevers or respiratory complaints.    Anxiety   PTSD  -Does not appear to be on any home meds at this time.   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    I discussed the patients findings and my recommendations with patient and family.    Notified charge nurse that patient needs SI precautions for now.    VTE Prophylaxis - SCDs.  Code Status - Full code.       MARTA Li  [facility]  07/21/23  11:49 EDT

## 2023-07-22 ENCOUNTER — APPOINTMENT (OUTPATIENT)
Dept: CARDIOLOGY | Facility: HOSPITAL | Age: 28
End: 2023-07-22
Payer: COMMERCIAL

## 2023-07-22 VITALS
BODY MASS INDEX: 20.39 KG/M2 | RESPIRATION RATE: 16 BRPM | HEIGHT: 63 IN | OXYGEN SATURATION: 100 % | TEMPERATURE: 99.1 F | HEART RATE: 111 BPM | WEIGHT: 115.08 LBS | SYSTOLIC BLOOD PRESSURE: 117 MMHG | DIASTOLIC BLOOD PRESSURE: 76 MMHG

## 2023-07-22 LAB
ANION GAP SERPL CALCULATED.3IONS-SCNC: 14.1 MMOL/L (ref 5–15)
AORTIC ARCH: 1.7 CM
BASOPHILS # BLD AUTO: 0.02 10*3/MM3 (ref 0–0.2)
BASOPHILS NFR BLD AUTO: 0.3 % (ref 0–1.5)
BH CV ECHO MEAS - ACS: 2.2 CM
BH CV ECHO MEAS - AO MAX PG: 3.3 MMHG
BH CV ECHO MEAS - AO MEAN PG: 1.93 MMHG
BH CV ECHO MEAS - AO ROOT DIAM: 2.9 CM
BH CV ECHO MEAS - AO V2 MAX: 90.8 CM/SEC
BH CV ECHO MEAS - AO V2 VTI: 17.5 CM
BH CV ECHO MEAS - AVA(I,D): 2.5 CM2
BH CV ECHO MEAS - EDV(CUBED): 50.1 ML
BH CV ECHO MEAS - EDV(MOD-SP2): 62 ML
BH CV ECHO MEAS - EDV(MOD-SP4): 89 ML
BH CV ECHO MEAS - EF(MOD-BP): 64.8 %
BH CV ECHO MEAS - EF(MOD-SP2): 59.7 %
BH CV ECHO MEAS - EF(MOD-SP4): 65.2 %
BH CV ECHO MEAS - ESV(CUBED): 12.7 ML
BH CV ECHO MEAS - ESV(MOD-SP2): 25 ML
BH CV ECHO MEAS - ESV(MOD-SP4): 31 ML
BH CV ECHO MEAS - FS: 36.8 %
BH CV ECHO MEAS - IVS/LVPW: 0.81 CM
BH CV ECHO MEAS - IVSD: 0.57 CM
BH CV ECHO MEAS - LAT PEAK E' VEL: 9.8 CM/SEC
BH CV ECHO MEAS - LV DIASTOLIC VOL/BSA (35-75): 58.2 CM2
BH CV ECHO MEAS - LV MASS(C)D: 60.9 GRAMS
BH CV ECHO MEAS - LV MAX PG: 2.8 MMHG
BH CV ECHO MEAS - LV MEAN PG: 1.53 MMHG
BH CV ECHO MEAS - LV SYSTOLIC VOL/BSA (12-30): 20.3 CM2
BH CV ECHO MEAS - LV V1 MAX: 83.2 CM/SEC
BH CV ECHO MEAS - LV V1 VTI: 14.4 CM
BH CV ECHO MEAS - LVIDD: 3.7 CM
BH CV ECHO MEAS - LVIDS: 2.33 CM
BH CV ECHO MEAS - LVOT AREA: 3.1 CM2
BH CV ECHO MEAS - LVOT DIAM: 1.99 CM
BH CV ECHO MEAS - LVPWD: 0.71 CM
BH CV ECHO MEAS - MED PEAK E' VEL: 10.2 CM/SEC
BH CV ECHO MEAS - MV A DUR: 0.14 SEC
BH CV ECHO MEAS - MV A MAX VEL: 49 CM/SEC
BH CV ECHO MEAS - MV DEC SLOPE: 381.8 CM/SEC2
BH CV ECHO MEAS - MV DEC TIME: 0.13 MSEC
BH CV ECHO MEAS - MV E MAX VEL: 83.9 CM/SEC
BH CV ECHO MEAS - MV E/A: 1.71
BH CV ECHO MEAS - MV MAX PG: 4.2 MMHG
BH CV ECHO MEAS - MV MEAN PG: 1.41 MMHG
BH CV ECHO MEAS - MV P1/2T: 79.1 MSEC
BH CV ECHO MEAS - MV V2 VTI: 23.9 CM
BH CV ECHO MEAS - MVA(P1/2T): 2.8 CM2
BH CV ECHO MEAS - MVA(VTI): 1.87 CM2
BH CV ECHO MEAS - PA ACC TIME: 0.15 SEC
BH CV ECHO MEAS - PA V2 MAX: 83 CM/SEC
BH CV ECHO MEAS - RV MAX PG: 2.6 MMHG
BH CV ECHO MEAS - RV V1 MAX: 81 CM/SEC
BH CV ECHO MEAS - RV V1 VTI: 16 CM
BH CV ECHO MEAS - SI(MOD-SP2): 24.2 ML/M2
BH CV ECHO MEAS - SI(MOD-SP4): 37.9 ML/M2
BH CV ECHO MEAS - SUP REN AO DIAM: 1.5 CM
BH CV ECHO MEAS - SV(LVOT): 44.6 ML
BH CV ECHO MEAS - SV(MOD-SP2): 37 ML
BH CV ECHO MEAS - SV(MOD-SP4): 58 ML
BH CV ECHO MEAS - TAPSE (>1.6): 2.45 CM
BH CV ECHO MEASUREMENTS AVERAGE E/E' RATIO: 8.39
BH CV XLRA - TDI S': 14.5 CM/SEC
BUN SERPL-MCNC: 17 MG/DL (ref 6–20)
BUN/CREAT SERPL: 34 (ref 7–25)
CALCIUM SPEC-SCNC: 9 MG/DL (ref 8.6–10.5)
CHLORIDE SERPL-SCNC: 105 MMOL/L (ref 98–107)
CO2 SERPL-SCNC: 18.9 MMOL/L (ref 22–29)
CREAT SERPL-MCNC: 0.5 MG/DL (ref 0.57–1)
DEPRECATED RDW RBC AUTO: 39.6 FL (ref 37–54)
EGFRCR SERPLBLD CKD-EPI 2021: 132.8 ML/MIN/1.73
EOSINOPHIL # BLD AUTO: 0.03 10*3/MM3 (ref 0–0.4)
EOSINOPHIL NFR BLD AUTO: 0.4 % (ref 0.3–6.2)
ERYTHROCYTE [DISTWIDTH] IN BLOOD BY AUTOMATED COUNT: 13 % (ref 12.3–15.4)
GLUCOSE SERPL-MCNC: 80 MG/DL (ref 65–99)
HCT VFR BLD AUTO: 37.9 % (ref 34–46.6)
HGB BLD-MCNC: 12.7 G/DL (ref 12–15.9)
IMM GRANULOCYTES # BLD AUTO: 0.01 10*3/MM3 (ref 0–0.05)
IMM GRANULOCYTES NFR BLD AUTO: 0.1 % (ref 0–0.5)
LYMPHOCYTES # BLD AUTO: 1.59 10*3/MM3 (ref 0.7–3.1)
LYMPHOCYTES NFR BLD AUTO: 20.5 % (ref 19.6–45.3)
MCH RBC QN AUTO: 28.6 PG (ref 26.6–33)
MCHC RBC AUTO-ENTMCNC: 33.5 G/DL (ref 31.5–35.7)
MCV RBC AUTO: 85.4 FL (ref 79–97)
MONOCYTES # BLD AUTO: 0.6 10*3/MM3 (ref 0.1–0.9)
MONOCYTES NFR BLD AUTO: 7.7 % (ref 5–12)
NEUTROPHILS NFR BLD AUTO: 5.5 10*3/MM3 (ref 1.7–7)
NEUTROPHILS NFR BLD AUTO: 71 % (ref 42.7–76)
NRBC BLD AUTO-RTO: 0 /100 WBC (ref 0–0.2)
PLATELET # BLD AUTO: 270 10*3/MM3 (ref 140–450)
PMV BLD AUTO: 9.1 FL (ref 6–12)
POTASSIUM SERPL-SCNC: 3.7 MMOL/L (ref 3.5–5.2)
RBC # BLD AUTO: 4.44 10*6/MM3 (ref 3.77–5.28)
SINUS: 3.4 CM
SODIUM SERPL-SCNC: 138 MMOL/L (ref 136–145)
STJ: 2.7 CM
WBC NRBC COR # BLD: 7.75 10*3/MM3 (ref 3.4–10.8)

## 2023-07-22 PROCEDURE — 93306 TTE W/DOPPLER COMPLETE: CPT | Performed by: INTERNAL MEDICINE

## 2023-07-22 PROCEDURE — 93306 TTE W/DOPPLER COMPLETE: CPT

## 2023-07-22 PROCEDURE — 90791 PSYCH DIAGNOSTIC EVALUATION: CPT

## 2023-07-22 PROCEDURE — 80048 BASIC METABOLIC PNL TOTAL CA: CPT | Performed by: INTERNAL MEDICINE

## 2023-07-22 PROCEDURE — 85025 COMPLETE CBC W/AUTO DIFF WBC: CPT | Performed by: INTERNAL MEDICINE

## 2023-07-22 RX ADMIN — Medication 10 ML: at 09:43

## 2023-07-22 RX ADMIN — CLONIDINE HYDROCHLORIDE 0.1 MG: 0.1 TABLET ORAL at 14:44

## 2023-07-22 RX ADMIN — HYDROXYZINE HYDROCHLORIDE 50 MG: 25 TABLET ORAL at 14:45

## 2023-07-22 NOTE — PLAN OF CARE
Problem: Adult Inpatient Plan of Care  Goal: Plan of Care Review  Outcome: Ongoing, Progressing  Flowsheets (Taken 7/22/2023 0525)  Progress: improving  Plan of Care Reviewed With:   patient   significant other  Goal: Patient-Specific Goal (Individualized)  Outcome: Ongoing, Progressing  Goal: Absence of Hospital-Acquired Illness or Injury  Outcome: Ongoing, Progressing  Intervention: Identify and Manage Fall Risk  Recent Flowsheet Documentation  Taken 7/22/2023 0400 by Yen Pineda RN  Safety Promotion/Fall Prevention: safety round/check completed  Taken 7/22/2023 0200 by Yen Pineda RN  Safety Promotion/Fall Prevention: safety round/check completed  Taken 7/22/2023 0000 by Yen Pineda RN  Safety Promotion/Fall Prevention: safety round/check completed  Taken 7/21/2023 2200 by Yen Pineda RN  Safety Promotion/Fall Prevention: safety round/check completed  Taken 7/21/2023 2015 by Yen Pineda RN  Safety Promotion/Fall Prevention: safety round/check completed  Intervention: Prevent Skin Injury  Recent Flowsheet Documentation  Taken 7/21/2023 2015 by Yen Pineda RN  Skin Protection:   adhesive use limited   incontinence pads utilized   tubing/devices free from skin contact  Goal: Optimal Comfort and Wellbeing  Outcome: Ongoing, Progressing  Goal: Readiness for Transition of Care  Outcome: Ongoing, Progressing     Problem: Fall Injury Risk  Goal: Absence of Fall and Fall-Related Injury  Outcome: Ongoing, Progressing  Intervention: Identify and Manage Contributors  Recent Flowsheet Documentation  Taken 7/21/2023 2015 by Yen Pineda RN  Medication Review/Management: medications reviewed  Intervention: Promote Injury-Free Environment  Recent Flowsheet Documentation  Taken 7/22/2023 0400 by Yen Pineda RN  Safety Promotion/Fall Prevention: safety round/check completed  Taken 7/22/2023 0200 by Yen Pineda RN  Safety Promotion/Fall Prevention: safety round/check completed  Taken 7/22/2023 0000 by Yen Pineda  RN  Safety Promotion/Fall Prevention: safety round/check completed  Taken 7/21/2023 2200 by Yen Pineda, RN  Safety Promotion/Fall Prevention: safety round/check completed  Taken 7/21/2023 2015 by Yen Pineda, RN  Safety Promotion/Fall Prevention: safety round/check completed     Problem: Skin Injury Risk Increased  Goal: Skin Health and Integrity  Outcome: Ongoing, Progressing  Intervention: Optimize Skin Protection  Recent Flowsheet Documentation  Taken 7/21/2023 2015 by Yen Pineda, RN  Pressure Reduction Techniques: frequent weight shift encouraged  Pressure Reduction Devices:   pressure-redistributing mattress utilized   positioning supports utilized  Skin Protection:   adhesive use limited   incontinence pads utilized   tubing/devices free from skin contact   Goal Outcome Evaluation:  Plan of Care Reviewed With: patient, significant other        Progress: improving
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
## 2023-07-22 NOTE — PROGRESS NOTES
Name: Nereida Thomas ADMIT: 2023   : 1995  PCP: Provider, No Known    MRN: [id number] LOS: 1 days   AGE/SEX: 27 y.o. female  ROOM: [facility]     Subjective   Subjective   Patient is lying in the bed and does not appear to be any major distress.  Denies nausea, vomiting, abdominal pain, chest pain.  She is much more awake and alert and answering questions quite well.  Denies any suicidal ideation or intention.       Objective   Objective   Vital Signs  Temp:  [97.5 °F (36.4 °C)-99.5 °F (37.5 °C)] 99.1 °F (37.3 °C)  Heart Rate:  [] 111  Resp:  [16-22] 16  BP: (115-132)/(76-94) 117/76  SpO2:  [97 %-100 %] 100 %  on   ;   Device (Oxygen Therapy): room air  Body mass index is 20.39 kg/m².  Physical Exam  HEENT: PERRLA, extraocular movements intact, Scleras no icterus  Neck: Supple, no JVD  Cardiovascular: Regular rate and rhythm with normal S1 and S2  Respiratory: Fairly clear to auscultation bilaterally with no wheezes  GI: Soft, nontender, bowel sounds present  Extremities: No edema, palpable pedal pulses  Neurologic: Grossly nonfocal, no facial asymmetry    Results Review     I reviewed the patient's new clinical results.  Results from last 7 days   Lab Units 23  0529 23  0826 23  0346   WBC 10*3/mm3 7.75 15.04* 10.31   HEMOGLOBIN g/dL 12.7 14.3 15.3   PLATELETS 10*3/mm3 270 330 391     Results from last 7 days   Lab Units 23  0529 23  0826 23  0346   SODIUM mmol/L 138 142 141   POTASSIUM mmol/L 3.7 3.6 3.8   CHLORIDE mmol/L 105 103 99   CO2 mmol/L 18.9* 24.1 20.0*   BUN mg/dL 17 16 14   CREATININE mg/dL 0.50* 0.71 0.82   GLUCOSE mg/dL 80 123* 96   EGFR mL/min/1.73 132.8 120.4 101.3     Results from last 7 days   Lab Units 23  0346   ALBUMIN g/dL 5.8*   BILIRUBIN mg/dL 0.4   ALK PHOS U/L 95   AST (SGOT) U/L 41*   ALT (SGPT) U/L 28     Results from last 7 days   Lab Units 23  0529 23  0826 23  0346   CALCIUM mg/dL 9.0 10.2 11.5*   ALBUMIN g/dL   --   --  5.8*     Results from last 7 days   Lab Units 07/21/23  0826   PROCALCITONIN ng/mL 0.20     No results found for: HGBA1C, POCGLU    CT Head Without Contrast    Result Date: 7/21/2023  Electronically signed by Ryan Valdovinos MD on 07-21-23 at 0508    XR Chest 1 View    Result Date: 7/21/2023  Negative chest radiograph.  This report was finalized on 7/21/2023 3:13 PM by Dr. Fawad Owen M.D.     I have personally reviewed all medications:  Scheduled Medications  sodium chloride, 10 mL, Intravenous, Q12H    Infusions  sodium chloride, 100 mL/hr, Last Rate: 100 mL/hr (07/21/23 1215)    Diet  Diet: Regular/House Diet; Texture: Regular Texture (IDDSI 7); Fluid Consistency: Thin (IDDSI 0)    I have personally reviewed:  [x]  Laboratory   [x]  Microbiology   [x]  Radiology   [x]  EKG/Telemetry  [x]  Cardiology/Vascular   []  Pathology    []  Records       Assessment/Plan     Active Hospital Problems    Diagnosis  POA    **AMS (altered mental status) [R41.82]  Yes    Polysubstance abuse [F19.10]  Yes    Drug overdose [T50.901A]  Yes    Anxiety [F41.9]  Yes    PTSD (post-traumatic stress disorder) [F43.10]  Yes    Metabolic acidosis, increased anion gap [E87.29]  Yes    Hypercalcemia [E83.52]  Yes    Leukocytosis [D72.829]  Unknown      Resolved Hospital Problems   No resolved problems to display.       27 y.o. female admitted with AMS (altered mental status).    1.Polysubstance abuse/drug overdose, appears to be a recreational overdose and currently denies any suicidal ideation or intention.  Axis consult is also been obtained.  2.  Leukocytosis, most likely reactive finding and WBC is now back to normal and patient is afebrile.  Chest x-ray is negative.  3.  Metabolic acidosis, this has now resolved.  4.  On SCDs for DVT prophylaxis.  5.  CODE STATUS is full code.      Dillon Kaur MD  [facility]  07/22/23  15:56 EDT

## 2023-07-22 NOTE — PLAN OF CARE
Problem: Adult Inpatient Plan of Care  Goal: Absence of Hospital-Acquired Illness or Injury  Intervention: Identify and Manage Fall Risk  Recent Flowsheet Documentation  Taken 7/22/2023 1453 by Velma Esparza RN  Safety Promotion/Fall Prevention:   safety round/check completed   nonskid shoes/slippers when out of bed   fall prevention program maintained   activity supervised   assistive device/personal items within reach  Taken 7/22/2023 1200 by Velma Esparza RN  Safety Promotion/Fall Prevention:   safety round/check completed   nonskid shoes/slippers when out of bed   fall prevention program maintained   activity supervised  Taken 7/22/2023 1000 by Velma Esparza RN  Safety Promotion/Fall Prevention:   safety round/check completed   nonskid shoes/slippers when out of bed   fall prevention program maintained   assistive device/personal items within reach   activity supervised  Taken 7/22/2023 0745 by Velma Esparza RN  Safety Promotion/Fall Prevention:   activity supervised   nonskid shoes/slippers when out of bed   safety round/check completed   fall prevention program maintained  Intervention: Prevent Skin Injury  Recent Flowsheet Documentation  Taken 7/22/2023 1453 by Velma Esparza RN  Body Position: position changed independently  Taken 7/22/2023 1200 by Velma Esparza RN  Body Position: position changed independently  Taken 7/22/2023 1000 by Velma Esparza RN  Body Position: position changed independently  Taken 7/22/2023 0745 by Velma Esparza RN  Body Position: position changed independently  Intervention: Prevent and Manage VTE (Venous Thromboembolism) Risk  Recent Flowsheet Documentation  Taken 7/22/2023 0745 by Velma Esparza RN  Activity Management: up ad pedro luis  Intervention: Prevent Infection  Recent Flowsheet Documentation  Taken 7/22/2023 1453 by Velma Esparza RN  Infection Prevention:   rest/sleep promoted   single patient room provided  Taken 7/22/2023 1200 by Isaias  Velma, RN  Infection Prevention:   single patient room provided   rest/sleep promoted  Taken 7/22/2023 1000 by Velma Esparza, RN  Infection Prevention:   rest/sleep promoted   single patient room provided  Taken 7/22/2023 0745 by Velma Esparza, RN  Infection Prevention:   rest/sleep promoted   single patient room provided  Goal: Optimal Comfort and Wellbeing  Intervention: Provide Person-Centered Care  Recent Flowsheet Documentation  Taken 7/22/2023 0745 by Velma Esparza, RN  Trust Relationship/Rapport: care explained   Goal Outcome Evaluation:   Patient is in the bed slightly restless this afternoon. No SI Is voiced. COW 7.  Medication given according to orders.  Sitter at bedside. Will continue to monitor.
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
## 2023-07-24 NOTE — PAYOR COMM NOTE
"Nereida Thomas (27 y.o. Female)     PLEASE SEE ATTACHED FOR INPT AUTH     PT ID       [id number]         PLEASE CALL GEORGI NEFF RN/ DEPT @ [phone]  OR FAX  DEPARTMENT @  [phone]    THANK YOU   GEORGI NEFF RN  [facility]          Date of Birth   1995    Social Security Number       Address   [address] [address]    Home Phone   [phone]    MRN   [id number]       Scientology   None    Marital Status   Single                            Admission Date   7/21/23    Admission Type   Emergency    Admitting Provider   Dillon Kaur MD    Attending Provider       Department, Room/Bed   [facility] [address], P595/1       Discharge Date   7/22/2023    Discharge Disposition   Left Against Medical Advice    Discharge Destination                                 Attending Provider: (none)   Allergies: No Known Allergies    Isolation: None   Infection: None   Code Status: Prior    Ht: 160 cm (62.99\")   Wt: 52.2 kg (115 lb 1.3 oz)    Admission Cmt: None   Principal Problem: AMS (altered mental status) [R41.82]                   Active Insurance as of 7/21/2023       Primary Coverage       Payor Plan Insurance Group Employer/Plan Group    [facility] [place] [facility] BY SANDEEP [id number]       Payor Plan Address Payor Plan Phone Number Payor Plan Fax Number Effective Dates    [address] [address]   1/1/2021 - None Entered    [facility] [id number]         Subscriber Name Subscriber Birth Date Member ID       NEREIDA THOMAS 1995 [id number]                     Emergency Contacts        (Rel.) Home Phone Work Phone Mobile Phone    MARÍA MESSER (Significant Other) [phone] -- --    KATHY MESSER (Other) -- -- [phone]              [place]: NPI [id number]  Tax ID [id number]     History & Physical        Mirna Forman APRN at 07/21/23 0826       Attestation signed by Dillon Kaur MD at 07/21/23 8373      Please note " nurse practitioner's history and physical has been reviewed and I do concur with the plan.    Patient is a 77-year-old female with long history of anxiety/PTSD and reportedly uses illicit drugs recreationally according to the boyfriend is at the bedside was brought to the emergency room because she was less responsive and more confused and urine testing did test positive for cocaine and fentanyl.  Patient at the time of my evaluation remains somnolent and most of the information was provided by the boyfriend is at the bedside.  Access consult is also been obtained and needs extensive counseling once more awake and alert.  Suicide precautions are also being undertaken.  Of note WBC is on the rise and most likely reactive finding and if she spikes a fever will consider treatment for aspiration pneumonia.Chest x-ray has been ordered and results are pending.  HEENT: PERRLA, extraocular movements cannot be assessed  Scleras no icterus  Neck: Supple, no JVD  Cardiovascular: Regular rate and rhythm with normal S1 and S2  Respiratory: Fairly clear to auscultation bilaterally with no wheezes  GI: Soft, nontender, bowel sounds are present  Extremities: No edema, palpable pulses  Neurologic: Moves all 4 extremities to pain stimulus no facial asymmetry                    Patient Name:  Nereida Thomas  YOB: 1995  MRN:  [id number]  Admit Date:  7/21/2023  Patient Care Team:  Provider, No Known as PCP - General      Subjective   History Present Illness     Chief Complaint   Patient presents with    Altered Mental Status     History of Present Illness  Ms. Thomas is a 27 y.o. with a history of anxiety, PTSD and drug abuse that presents to [facility] for being found unresponsive. Per ED notes, EMS was called by the patient's boyfriend after he found the patient down and unresponsive at home. He administered Narcan and the patient was responsive by the time that paramedics arrived. She was brought to the ED  where she was having diarrhea as well as anxiety and agitation.    The patient's boyfriend reports the patient has a history of recreational drug use. 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 He states she recently got some Xanax from a co-worker, but has misused some opiates in the past d/t chronic neck and back pain. He states she vapes but does not smoke cigarettes and does not drink. She has apparently occasionally used cocaine, but no heroin or marijuana to his knowledge. He states that he went to bed an hour earlier to her last night, but woke up to her at the end of the bed twitching and minimally responsive. He states he had Narcan at the house so gave this and decided to give some chest compressions, but states she never lost a pulse either. EMS arrived and she was brought to the ED as above. He states she has no history of SI/HI and has never been treated for any drug abuse. He states they have lived together for the last 6 years as her adoptive family resides in [place].    In the ED, she was tachycardic and hypertensive but otherwise hemodynamically stable. Lab work showed an unremarkable CBC with negative ethanol, salicylate and acetaminophen. Renal function was preserved but anion gap 22 with CO2 20. UDS was positive for cocaine and fentanyl. CT head was negative for acute findings. She was acutely agitated in the ED and required 2 doses of IV Ativan as well as droperidol.     Review of Systems   Unable to perform ROS: Mental status change      Personal History     Past Medical History:   Diagnosis Date    Anxiety     Neck pain      Past Surgical History:   Procedure Laterality Date    NO PAST SURGERIES       Family History   Adopted: Yes     Social History     Vaping Use    Vaping Use: Every day    Substances: Nicotine   Substance Use Topics    Drug use: Yes     Types: Codeine, Benzodiazepines, Other     Comment:  prescription opiates     No medications prior to admission.     Allergies:  No Known Allergies    Objective    Objective     Vital Signs  Temp:  [99.1 °F (37.3 °C)-99.4 °F (37.4 °C)] 99.1 °F (37.3 °C)  Heart Rate:  [] 114  Resp:  [24] 24  BP: (101-134)/() 112/79  SpO2:  [88 %-100 %] 100 %  on   ;      Body mass index is 20.37 kg/m².    Physical Exam  Vitals and nursing note reviewed.   Constitutional:       Appearance: She is ill-appearing. She is not toxic-appearing.   HENT:      Head: Normocephalic and atraumatic.      Right Ear: External ear normal.      Left Ear: External ear normal.      Nose: Nose normal.   Cardiovascular:      Rate and Rhythm: Normal rate and regular rhythm.      Pulses: Normal pulses.   Pulmonary:      Effort: Pulmonary effort is normal. No respiratory distress.      Comments: Poor inspiratory effort d/t patient's cooperation. Diminished throughout.  Abdominal:      General: Bowel sounds are normal. There is no distension.      Palpations: Abdomen is soft.      Tenderness: There is no abdominal tenderness.   Musculoskeletal:         General: No swelling. Normal range of motion.      Cervical back: Normal range of motion and neck supple.   Skin:     General: Skin is warm and dry.      Findings: Bruising present.   Neurological:      Comments: Sedated at present. Sleeping. She will not follow commands. Boyfriend states she has said a few words here and there to him.   Psychiatric:      Comments: Withdrawn, minimally interactive     Results Review:  I reviewed the patient's new clinical results.  I reviewed the patient's new imaging results and agree with the interpretation.  I reviewed the patient's other test results and agree with the interpretation  I personally viewed and interpreted the patient's EKG/Telemetry data    Lab Results (last 24 hours)       Procedure Component Value Units Date/Time    CBC & Differential [[id number]]  (Abnormal) Collected: 07/21/23 0346    Specimen:  Blood Updated: 07/21/23 0403    Narrative:      The following orders were created for panel order CBC & Differential.  Procedure                               Abnormality         Status                     ---------                               -----------         ------                     CBC Auto Differential[[id number]]        Abnormal            Final result                 Please view results for these tests on the individual orders.    Comprehensive Metabolic Panel [[id number]]  (Abnormal) Collected: 07/21/23 0346    Specimen: Blood Updated: 07/21/23 0436     Glucose 96 mg/dL      BUN 14 mg/dL      Creatinine 0.82 mg/dL      Sodium 141 mmol/L      Potassium 3.8 mmol/L      Chloride 99 mmol/L      CO2 20.0 mmol/L      Calcium 11.5 mg/dL      Total Protein 8.7 g/dL      Albumin 5.8 g/dL      ALT (SGPT) 28 U/L      AST (SGOT) 41 U/L      Alkaline Phosphatase 95 U/L      Total Bilirubin 0.4 mg/dL      Globulin 2.9 gm/dL      A/G Ratio 2.0 g/dL      BUN/Creatinine Ratio 17.1     Anion Gap 22.0 mmol/L      eGFR 101.3 mL/min/1.73     Narrative:      GFR Normal >60  Chronic Kidney Disease <60  Kidney Failure <15      hCG, Serum, Qualitative [[id number]]  (Normal) Collected: 07/21/23 0346    Specimen: Blood Updated: 07/21/23 0439     HCG Qualitative Negative    Ethanol [[id number]] Collected: 07/21/23 0346    Specimen: Blood Updated: 07/21/23 0436     Ethanol <10 mg/dL      Ethanol % <0.010 %     Acetaminophen Level [[id number]]  (Normal) Collected: 07/21/23 0346    Specimen: Blood Updated: 07/21/23 0436     Acetaminophen <5.0 mcg/mL     Salicylate Level [[id number]]  (Normal) Collected: 07/21/23 0346    Specimen: Blood Updated: 07/21/23 0436     Salicylate <0.3 mg/dL     Narrative:      Therapeutic range for Salicylates:  3.0 - 10.0 mg/dL for antipyretic/analgesic conditions  15.0 - 30.0 mg/dL for anti-inflammatory conditions    CBC Auto Differential [[id number]]  (Abnormal) Collected: 07/21/23 0346    Specimen: Blood  Updated: 07/21/23 0403     WBC 10.31 10*3/mm3      RBC 5.22 10*6/mm3      Hemoglobin 15.3 g/dL      Hematocrit 45.6 %      MCV 87.4 fL      MCH 29.3 pg      MCHC 33.6 g/dL      RDW 13.2 %      RDW-SD 42.9 fl      MPV 8.9 fL      Platelets 391 10*3/mm3      Neutrophil % 55.8 %      Lymphocyte % 34.1 %      Monocyte % 8.0 %      Eosinophil % 1.6 %      Basophil % 0.4 %      Immature Grans % 0.1 %      Neutrophils, Absolute 5.75 10*3/mm3      Lymphocytes, Absolute 3.52 10*3/mm3      Monocytes, Absolute 0.82 10*3/mm3      Eosinophils, Absolute 0.17 10*3/mm3      Basophils, Absolute 0.04 10*3/mm3      Immature Grans, Absolute 0.01 10*3/mm3      nRBC 0.0 /100 WBC     Urine Drug Screen - Urine, Clean Catch [[id number]]  (Abnormal) Collected: 07/21/23 0347    Specimen: Urine, Clean Catch Updated: 07/21/23 0438     Amphet/Methamphet, Screen Negative     Barbiturates Screen, Urine Negative     Benzodiazepine Screen, Urine Negative     Cocaine Screen, Urine Positive     Opiate Screen Negative     THC, Screen, Urine Negative     Methadone Screen, Urine Negative     Oxycodone Screen, Urine Negative     Fentanyl, Urine Positive    Narrative:      Negative Thresholds Per Drugs Screened:    Amphetamines                 500 ng/ml  Barbiturates                 200 ng/ml  Benzodiazepines              100 ng/ml  Cocaine                      300 ng/ml  Methadone                    300 ng/ml  Opiates                      300 ng/ml  Oxycodone                    100 ng/ml  THC                           50 ng/ml  Fentanyl                       5 ng/ml      The Normal Value for all drugs tested is negative. This report includes final unconfirmed screening results to be used for medical treatment purposes only. Unconfirmed results must not be used for non-medical purposes such as employment or legal testing. Clinical consideration should be applied to any drug of abuse test, particularly when unconfirmed results are used.            Basic  Metabolic Panel [[id number]]  (Abnormal) Collected: 07/21/23 0826    Specimen: Blood Updated: 07/21/23 0956     Glucose 123 mg/dL      BUN 16 mg/dL      Creatinine 0.71 mg/dL      Sodium 142 mmol/L      Potassium 3.6 mmol/L      Chloride 103 mmol/L      CO2 24.1 mmol/L      Calcium 10.2 mg/dL      BUN/Creatinine Ratio 22.5     Anion Gap 14.9 mmol/L      eGFR 120.4 mL/min/1.73     Narrative:      GFR Normal >60  Chronic Kidney Disease <60  Kidney Failure <15      CBC (No Diff) [[id number]]  (Abnormal) Collected: 07/21/23 0826    Specimen: Blood Updated: 07/21/23 0933     WBC 15.04 10*3/mm3      RBC 5.02 10*6/mm3      Hemoglobin 14.3 g/dL      Hematocrit 42.8 %      MCV 85.3 fL      MCH 28.5 pg      MCHC 33.4 g/dL      RDW 12.9 %      RDW-SD 39.6 fl      MPV 9.0 fL      Platelets 330 10*3/mm3             Imaging Results (Last 24 Hours)       Procedure Component Value Units Date/Time    CT Head Without Contrast [[id number]] Collected: 07/21/23 0509     Updated: 07/21/23 0509    Narrative:        Patient: KARO BELTRAN  Time Out: 05:08  Exam(s): CT HEAD Without Contrast     EXAM:    CT Head Without Intravenous Contrast    CLINICAL HISTORY:     Reason for exam: Delirium.    TECHNIQUE:    Axial computed tomography images of the head brain without intravenous   contrast.  CTDI is 55.44 mGy and DLP is 980 mGy-cm.  This CT exam was   performed according to the principle of ALARA (As Low As Reasonably   Achievable) by using one or more of the following dose reduction   techniques: automated exposure control, adjustment of the mA and or kV   according to patient size, and or use of iterative reconstruction   technique.    COMPARISON:    No relevant prior studies available.    FINDINGS:    Artifacts:  Motion artifact degrades image quality limits evaluation.    Brain:  There is no evidence of an acute transcortical infarct or acute   intracranial hemorrhage.  No significant white matter disease.    Ventricles:  Unremarkable.  No  ventriculomegaly.    Bones joints:  Unremarkable.  No acute fracture.    Soft tissues:  Unremarkable.    Sinuses:  Unremarkable as visualized.  No acute sinusitis.    Mastoid air cells:  Unremarkable as visualized.  No mastoid effusion.    IMPRESSION:         No acute findings in the head brain.      Impression:          Electronically signed by Ryan Valdovinos MD on 07-21-23 at 0508             Assessment/Plan     Active Hospital Problems    Diagnosis  POA    **AMS (altered mental status) [R41.82]  Yes    Polysubstance abuse [F19.10]  Yes    Drug overdose [T50.901A]  Yes    Anxiety [F41.9]  Yes    PTSD (post-traumatic stress disorder) [F43.10]  Yes    Metabolic acidosis, increased anion gap [E87.29]  Yes    Hypercalcemia [E83.52]  Yes    Leukocytosis [D72.829]  Unknown     Ms. Thomas is a 27 year old female who presented to the ED after being found unresponsive at home. She has a history of recreational drug use with cocaine as well as prior misuse of prescription opiates. Her boyfriend administered Narcan with improvement but she had confusion/agitation and diarrhea while in the ED. Her drug screen was positive for cocaine and fentanyl on arrival. Boyfriend recently reports her getting some Xanax from a friend at work.    AMS   Drug overdose  -Agitation/confusion secondary to drugs with reversal.   -Given PRN Ativan in the ED. She is sedated at this time. Will monitor for now.  -COWS protocol initiated.   -Access to see and will ask Psychiatry to see as well.  -Boyfriend denies any prior history of SI/HI, but patient not participating in interview at this time. Will start suicide precautions and have 1:1 monitoring until psych and/or access can evaluate.    Polysubstance abuse  -UDS positive for cocaine and fentanyl.  -She will use benzos when she can get them and has a history of using prescription pain killers as well.  -Access to see as above.    Metabolic acidosis  -Improved with fluids. AG closed.  Continue IVF for now.    Leukocytosis  -On repeat labs this AM. Likely reactive but with AMS cannot rule out aspiration.  -Check baseline CXR and add procalcitonin. Monitor for any fevers or respiratory complaints.    Anxiety   PTSD  -Does not appear to be on any home meds at this time.   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    I discussed the patients findings and my recommendations with patient and family.    Notified charge nurse that patient needs SI precautions for now.    VTE Prophylaxis - SCDs.  Code Status - Full code.       MARTA Li  [facility]  07/21/23  11:49 EDT      Electronically signed by Dillon Kaur MD at 07/21/23 1356          Emergency Department Notes        Miguel Bernal RN at 07/21/23 0549          Nursing report ED to floor  Nereida Thomas  27 y.o.  female    HPI :   Chief Complaint   Patient presents with    Altered Mental Status       Admitting doctor:   Dany Cain MD    Admitting diagnosis:   The primary encounter diagnosis was Polysubstance overdose, undetermined intent, initial encounter. Diagnoses of Acute delirium and Sinus tachycardia were also pertinent to this visit.    Code status:   Current Code Status       Date Active Code Status Order ID Comments User Context       7/21/2023 0506 CPR (Attempt to Resuscitate) [id number]  Jaylin Massey APRN ED        Question Answer    Code Status (Patient has no pulse and is not breathing) CPR (Attempt to Resuscitate)    Medical Interventions (Patient has pulse or is breathing) Full Support    Release to patient Routine Release                    Allergies:   Patient has no known allergies.    Isolation:   No active isolations    Intake and Output  No intake or output data in the 24 hours ending 07/21/23 0549    Weight:       07/21/23  0402   Weight: 52.2 kg (115 lb)       Most recent vitals:   Vitals:    07/21/23 0402 07/21/23 0451 07/21/23 0530 07/21/23 0533   BP:  (!) 101/36 (!)  "134/110    Pulse:   94 100   Resp: 24      Temp: 99.4 °F (37.4 °C)      TempSrc: Tympanic      SpO2:   (!) 88% 97%   Weight: 52.2 kg (115 lb)      Height: 160 cm (63\")          Active LDAs/IV Access:   Lines, Drains & Airways       Active LDAs       Name Placement date Placement time Site Days    Peripheral IV 07/21/23 0346 Anterior;Left;Proximal Antecubital 07/21/23  0346  Antecubital  less than 1                    Labs (abnormal labs have a star):   Labs Reviewed   COMPREHENSIVE METABOLIC PANEL - Abnormal; Notable for the following components:       Result Value    CO2 20.0 (*)     Calcium 11.5 (*)     Total Protein 8.7 (*)     Albumin 5.8 (*)     AST (SGOT) 41 (*)     Anion Gap 22.0 (*)     All other components within normal limits    Narrative:     GFR Normal >60  Chronic Kidney Disease <60  Kidney Failure <15     URINE DRUG SCREEN - Abnormal; Notable for the following components:    Cocaine Screen, Urine Positive (*)     Fentanyl, Urine Positive (*)     All other components within normal limits    Narrative:     Negative Thresholds Per Drugs Screened:    Amphetamines                 500 ng/ml  Barbiturates                 200 ng/ml  Benzodiazepines              100 ng/ml  Cocaine                      300 ng/ml  Methadone                    300 ng/ml  Opiates                      300 ng/ml  Oxycodone                    100 ng/ml  THC                           50 ng/ml  Fentanyl                       5 ng/ml      The Normal Value for all drugs tested is negative. This report includes final unconfirmed screening results to be used for medical treatment purposes only. Unconfirmed results must not be used for non-medical purposes such as employment or legal testing. Clinical consideration should be applied to any drug of abuse test, particularly when unconfirmed results are used.           CBC WITH AUTO DIFFERENTIAL - Abnormal; Notable for the following components:    Lymphocytes, Absolute 3.52 (*)     All other " components within normal limits   HCG, SERUM, QUALITATIVE - Normal   ACETAMINOPHEN LEVEL - Normal   SALICYLATE LEVEL - Normal    Narrative:     Therapeutic range for Salicylates:  3.0 - 10.0 mg/dL for antipyretic/analgesic conditions  15.0 - 30.0 mg/dL for anti-inflammatory conditions   ETHANOL   BASIC METABOLIC PANEL   CBC (NO DIFF)   CBC AND DIFFERENTIAL    Narrative:     The following orders were created for panel order CBC & Differential.  Procedure                               Abnormality         Status                     ---------                               -----------         ------                     CBC Auto Differential[[id number]]        Abnormal            Final result                 Please view results for these tests on the individual orders.       EKG:   No orders to display       Meds given in ED:   Medications   sodium chloride 0.9 % flush 10 mL (has no administration in time range)   sodium chloride 0.9 % flush 10 mL (has no administration in time range)   sodium chloride 0.9 % flush 10 mL (has no administration in time range)   sodium chloride 0.9 % infusion 40 mL (has no administration in time range)   nitroglycerin (NITROSTAT) SL tablet 0.4 mg (has no administration in time range)   sodium chloride 0.9 % infusion (100 mL/hr Intravenous New Bag 7/21/23 0544)   ondansetron (ZOFRAN) tablet 4 mg (has no administration in time range)     Or   ondansetron (ZOFRAN) injection 4 mg (has no administration in time range)   calcium carbonate (TUMS) chewable tablet 500 mg (200 mg elemental) (has no administration in time range)   cloNIDine (CATAPRES) tablet 0.1 mg (has no administration in time range)     Followed by   cloNIDine (CATAPRES) tablet 0.1 mg (has no administration in time range)     Followed by   cloNIDine (CATAPRES) tablet 0.1 mg (has no administration in time range)     Followed by   cloNIDine (CATAPRES) tablet 0.1 mg (has no administration in time range)     Followed by   cloNIDine  (CATAPRES) tablet 0.1 mg (has no administration in time range)   hydrOXYzine (ATARAX) tablet 50 mg (has no administration in time range)   sodium chloride 0.9 % bolus 1,000 mL (1,000 mL Intravenous New Bag 7/21/23 0351)   LORazepam (ATIVAN) injection 2 mg (2 mg Intravenous Given 7/21/23 0349)   droperidol (INAPSINE) injection 5 mg (5 mg Intravenous Given 7/21/23 0437)   LORazepam (ATIVAN) injection 1 mg (1 mg Intravenous Given 7/21/23 0541)   ondansetron (ZOFRAN) injection 4 mg (4 mg Intravenous Given 7/21/23 0540)       Imaging results:  CT Head Without Contrast    Result Date: 7/21/2023  Electronically signed by Ryan Valdovinos MD on 07-21-23 at 0508     Ambulatory status:   - bed rest    Social issues:   Social History     Socioeconomic History    Marital status: Single       NIH Stroke Scale:       Miguel Bernal RN  07/21/23 05:49 EDT         Electronically signed by Miguel Bernal RN at 07/21/23 0549       Nick Corral MD at 07/21/23 0320        Procedure Orders    1. Critical Care [[id number]] ordered by Nick Corral MD                  EMERGENCY DEPARTMENT ENCOUNTER    Room Number:  15/15  PCP: Provider, No Known  Historian: EMS report      HPI:  Chief Complaint: Drug overdose  A complete HPI/ROS/PMH/PSH/SH/FH are unobtainable due to: Altered mental state  Context: Nereida Thomas is a 27 y.o. female who presents to the ED today following a possible drug overdose.  EMS reports that the patient's boyfriend found her unresponsive on the side of the bed.  She was given intranasal Narcan with resolution in her unresponsive state.  Currently, the patient is highly agitated and having episodes of dry heaves as well as diarrhea.  She is currently not coherent and unable to contribute to the HPI.            PAST MEDICAL HISTORY  Active Ambulatory Problems     Diagnosis Date Noted    No Active Ambulatory Problems     Resolved Ambulatory Problems     Diagnosis Date Noted    No Resolved Ambulatory  Problems     No Additional Past Medical History         PAST SURGICAL HISTORY  History reviewed. No pertinent surgical history.      FAMILY HISTORY  History reviewed. No pertinent family history.      SOCIAL HISTORY  Social History     Socioeconomic History    Marital status: Single         ALLERGIES  Patient has no known allergies.        REVIEW OF SYSTEMS  Review of Systems   Unable to perform ROS: Mental status change          PHYSICAL EXAM  ED Triage Vitals [07/21/23 0317]   Temp Heart Rate Resp BP SpO2   -- (!) 130 24 127/75 98 %      Temp src Heart Rate Source Patient Position BP Location FiO2 (%)   -- -- -- -- --       Physical Exam  Constitutional:       General: She is not in acute distress.     Appearance: Normal appearance. She is not ill-appearing or toxic-appearing.   HENT:      Head: Normocephalic and atraumatic.   Eyes:      Extraocular Movements: Extraocular movements intact.      Pupils: Pupils are equal, round, and reactive to light.   Cardiovascular:      Rate and Rhythm: Normal rate and regular rhythm.      Heart sounds: No murmur heard.    No friction rub. No gallop.   Pulmonary:      Effort: Pulmonary effort is normal.      Breath sounds: Normal breath sounds.   Abdominal:      General: Abdomen is flat. There is no distension.      Palpations: Abdomen is soft.      Tenderness: There is no abdominal tenderness.   Musculoskeletal:         General: No swelling or tenderness. Normal range of motion.      Cervical back: Normal range of motion and neck supple.   Skin:     General: Skin is warm and dry.   Neurological:      General: No focal deficit present.      Mental Status: She is disoriented and confused.      Sensory: No sensory deficit.      Motor: No weakness.   Psychiatric:         Speech: She is noncommunicative.         Behavior: Behavior is agitated.         Vital signs and nursing notes reviewed.          LAB RESULTS  Recent Results (from the past 24 hour(s))   Comprehensive Metabolic  Panel    Collection Time: 07/21/23  3:46 AM    Specimen: Blood   Result Value Ref Range    Glucose 96 65 - 99 mg/dL    BUN 14 6 - 20 mg/dL    Creatinine 0.82 0.57 - 1.00 mg/dL    Sodium 141 136 - 145 mmol/L    Potassium 3.8 3.5 - 5.2 mmol/L    Chloride 99 98 - 107 mmol/L    CO2 20.0 (L) 22.0 - 29.0 mmol/L    Calcium 11.5 (H) 8.6 - 10.5 mg/dL    Total Protein 8.7 (H) 6.0 - 8.5 g/dL    Albumin 5.8 (H) 3.5 - 5.2 g/dL    ALT (SGPT) 28 1 - 33 U/L    AST (SGOT) 41 (H) 1 - 32 U/L    Alkaline Phosphatase 95 39 - 117 U/L    Total Bilirubin 0.4 0.0 - 1.2 mg/dL    Globulin 2.9 gm/dL    A/G Ratio 2.0 g/dL    BUN/Creatinine Ratio 17.1 7.0 - 25.0    Anion Gap 22.0 (H) 5.0 - 15.0 mmol/L    eGFR 101.3 >60.0 mL/min/1.73   hCG, Serum, Qualitative    Collection Time: 07/21/23  3:46 AM    Specimen: Blood   Result Value Ref Range    HCG Qualitative Negative Negative   Ethanol    Collection Time: 07/21/23  3:46 AM    Specimen: Blood   Result Value Ref Range    Ethanol <10 0 - 10 mg/dL    Ethanol % <0.010 %   Acetaminophen Level    Collection Time: 07/21/23  3:46 AM    Specimen: Blood   Result Value Ref Range    Acetaminophen <5.0 0.0 - 30.0 mcg/mL   Salicylate Level    Collection Time: 07/21/23  3:46 AM    Specimen: Blood   Result Value Ref Range    Salicylate <0.3 <=30.0 mg/dL   CBC Auto Differential    Collection Time: 07/21/23  3:46 AM    Specimen: Blood   Result Value Ref Range    WBC 10.31 3.40 - 10.80 10*3/mm3    RBC 5.22 3.77 - 5.28 10*6/mm3    Hemoglobin 15.3 12.0 - 15.9 g/dL    Hematocrit 45.6 34.0 - 46.6 %    MCV 87.4 79.0 - 97.0 fL    MCH 29.3 26.6 - 33.0 pg    MCHC 33.6 31.5 - 35.7 g/dL    RDW 13.2 12.3 - 15.4 %    RDW-SD 42.9 37.0 - 54.0 fl    MPV 8.9 6.0 - 12.0 fL    Platelets 391 140 - 450 10*3/mm3    Neutrophil % 55.8 42.7 - 76.0 %    Lymphocyte % 34.1 19.6 - 45.3 %    Monocyte % 8.0 5.0 - 12.0 %    Eosinophil % 1.6 0.3 - 6.2 %    Basophil % 0.4 0.0 - 1.5 %    Immature Grans % 0.1 0.0 - 0.5 %    Neutrophils, Absolute 5.75  1.70 - 7.00 10*3/mm3    Lymphocytes, Absolute 3.52 (H) 0.70 - 3.10 10*3/mm3    Monocytes, Absolute 0.82 0.10 - 0.90 10*3/mm3    Eosinophils, Absolute 0.17 0.00 - 0.40 10*3/mm3    Basophils, Absolute 0.04 0.00 - 0.20 10*3/mm3    Immature Grans, Absolute 0.01 0.00 - 0.05 10*3/mm3    nRBC 0.0 0.0 - 0.2 /100 WBC   Urine Drug Screen - Urine, Clean Catch    Collection Time: 07/21/23  3:47 AM    Specimen: Urine, Clean Catch   Result Value Ref Range    Amphet/Methamphet, Screen Negative Negative    Barbiturates Screen, Urine Negative Negative    Benzodiazepine Screen, Urine Negative Negative    Cocaine Screen, Urine Positive (A) Negative    Opiate Screen Negative Negative    THC, Screen, Urine Negative Negative    Methadone Screen, Urine Negative Negative    Oxycodone Screen, Urine Negative Negative    Fentanyl, Urine Positive (A) Negative       Ordered the above labs and reviewed the results.        RADIOLOGY  CT Head Without Contrast    Result Date: 7/21/2023  Patient: KARO BELTRAN  Time Out: 05:08 Exam(s): CT HEAD Without Contrast EXAM:   CT Head Without Intravenous Contrast CLINICAL HISTORY:    Reason for exam: Delirium. TECHNIQUE:   Axial computed tomography images of the head brain without intravenous contrast.  CTDI is 55.44 mGy and DLP is 980 mGy-cm.  This CT exam was performed according to the principle of ALARA (As Low As Reasonably Achievable) by using one or more of the following dose reduction techniques: automated exposure control, adjustment of the mA and or kV according to patient size, and or use of iterative reconstruction technique. COMPARISON:   No relevant prior studies available. FINDINGS:   Artifacts:  Motion artifact degrades image quality limits evaluation.   Brain:  There is no evidence of an acute transcortical infarct or acute intracranial hemorrhage.  No significant white matter disease.   Ventricles:  Unremarkable.  No ventriculomegaly.   Bones joints:  Unremarkable.  No acute fracture.   Soft  tissues:  Unremarkable.   Sinuses:  Unremarkable as visualized.  No acute sinusitis.   Mastoid air cells:  Unremarkable as visualized.  No mastoid effusion. IMPRESSION:       No acute findings in the head brain.     Electronically signed by Ryan Valdovinos MD on 07-21-23 at 0508     Ordered the above noted radiological studies. Reviewed by me in PACS.            PROCEDURES  Critical Care  Performed by: Nick Corral MD  Authorized by: Nick Corral MD     Critical care provider statement:     Critical care time (minutes):  32    Critical care time was exclusive of:  Separately billable procedures and treating other patients    Critical care was necessary to treat or prevent imminent or life-threatening deterioration of the following conditions:  Toxidrome    Critical care was time spent personally by me on the following activities:  Blood draw for specimens, development of treatment plan with patient or surrogate, discussions with consultants, evaluation of patient's response to treatment, examination of patient, obtaining history from patient or surrogate, ordering and performing treatments and interventions, ordering and review of laboratory studies, ordering and review of radiographic studies, pulse oximetry and re-evaluation of patient's condition    Care discussed with: admitting provider              MEDICATIONS GIVEN IN ER  Medications   sodium chloride 0.9 % flush 10 mL (has no administration in time range)   sodium chloride 0.9 % flush 10 mL (has no administration in time range)   sodium chloride 0.9 % flush 10 mL (has no administration in time range)   sodium chloride 0.9 % infusion 40 mL (has no administration in time range)   nitroglycerin (NITROSTAT) SL tablet 0.4 mg (has no administration in time range)   sodium chloride 0.9 % infusion (100 mL/hr Intravenous New Bag 7/21/23 0541)   ondansetron (ZOFRAN) tablet 4 mg (has no administration in time range)     Or   ondansetron (ZOFRAN)  injection 4 mg (has no administration in time range)   calcium carbonate (TUMS) chewable tablet 500 mg (200 mg elemental) (has no administration in time range)   cloNIDine (CATAPRES) tablet 0.1 mg (has no administration in time range)     Followed by   cloNIDine (CATAPRES) tablet 0.1 mg (has no administration in time range)     Followed by   cloNIDine (CATAPRES) tablet 0.1 mg (has no administration in time range)     Followed by   cloNIDine (CATAPRES) tablet 0.1 mg (has no administration in time range)     Followed by   cloNIDine (CATAPRES) tablet 0.1 mg (has no administration in time range)   hydrOXYzine (ATARAX) tablet 50 mg (has no administration in time range)   sodium chloride 0.9 % bolus 1,000 mL (1,000 mL Intravenous New Bag 7/21/23 0351)   LORazepam (ATIVAN) injection 2 mg (2 mg Intravenous Given 7/21/23 0349)   droperidol (INAPSINE) injection 5 mg (5 mg Intravenous Given 7/21/23 0437)   LORazepam (ATIVAN) injection 1 mg (1 mg Intravenous Given 7/21/23 0541)   ondansetron (ZOFRAN) injection 4 mg (4 mg Intravenous Given 7/21/23 0540)                   MEDICAL DECISION MAKING, PROGRESS, and CONSULTS    All labs have been independently reviewed by me.  All radiology studies have been reviewed by me and I have also reviewed the radiology report.   EKG's independently viewed and interpreted by me.  Discussion below represents my analysis of pertinent findings related to patient's condition, differential diagnosis, treatment plan and final disposition.      Additional sources:  - Discussed/ obtained information from independent historians: History obtained from the EMS report    - External (non-ED) record review: There are no previous inpatient or outpatient records currently available for ED review    - Chronic or social conditions impacting care: History of drug use    - Shared decision making: Admission decision based on shared conversations have between myself as well as the family at bedside.    Case  discussed with MARTA Payne for A, who will admit the patient to the hospital today for Dr. Cain.      Orders placed during this visit:  Orders Placed This Encounter   Procedures    Critical Care    CT Head Without Contrast    Comprehensive Metabolic Panel    hCG, Serum, Qualitative    Ethanol    Urine Drug Screen - Urine, Clean Catch    Acetaminophen Level    Salicylate Level    CBC Auto Differential    Basic Metabolic Panel    CBC (No Diff)    Diet: Regular/House Diet; Texture: Regular Texture (IDDSI 7); Fluid Consistency: Thin (IDDSI 0)    Vital Signs    Intake & Output    Weigh Patient    Oral Care    Place Sequential Compression Device    Maintain Sequential Compression Device    Telemetry - Maintain IV Access    Telemetry - Place Orders & Notify Provider of Results When Patient Experiences Acute Chest Pain, Dysrhythmia or Respiratory Distress    May Be Off Telemetry for Tests    Vital Signs - Blood Pressure & Pulse Prior to & 30 Minutes After Each Clonidine Dose    Notify Provider - Clonidine Detox    Code Status and Medical Interventions:    [facility] (on-call MD unless specified) Details    Insert Peripheral IV    Insert Peripheral IV    Inpatient Admission    CBC & Differential               Differential diagnosis includes but is not limited to:    Differential diagnosis includes but is not limited to drug overdose, intracranial hemorrhage, acute CVA, TIA, sepsis, urinary tract infection, polysubstance abuse, or severe electrolyte disturbance.      Independent interpretation of labs, radiology studies, and discussions with consultants:    CT scan of the head was independently interpreted by myself with my interpretation showing no areas of acute infarct/ischemia, mass effect, or hemorrhage.        ED Course as of 07/21/23 0545   Fri Jul 21, 2023   0408 The patient continues to be highly agitated and having episodes of dry heaving following Ativan administration.  We will treat her with a dose of IV  Inapsine and reassess following. [BM]   0435 The patient seems to have calmed quite a bit following the treatment with Inapsine.  We will continue to await the work-up and treat symptomatically. [BM]   0509 The patient continues to be highly agitated and showing acute delirium.  Heart rate remains very high in the 120s to 130s.  She continues to have bouts of diarrhea as well.  We will continue to treat with benzodiazepines as well as antiemetics.  I did inform the family that she did test positive for cocaine and fentanyl and we would be admitting her to the hospital for further treatment.  All questions have been answered and they are in agreement with the plan. [BM]   0511 The patient's presentation, work-up, as well as diagnosis and treatment plan were discussed at length with MARTA Payne for A.  She agrees to admit the patient to the hospital today for Dr. Cain. [BM]      ED Course User Index  [BM] Nick Corral MD             DIAGNOSIS  Final diagnoses:   Polysubstance overdose, undetermined intent, initial encounter   Acute delirium   Sinus tachycardia         DISPOSITION  ADMISSION    Discussed treatment plan and reason for admission with pt/family and admitting physician.  Pt/family voiced understanding of the plan for admission for further testing/treatment as needed.               Latest Documented Vital Signs:  As of 05:45 EDT  BP- (!) 134/110 HR- 100 Temp- 99.4 °F (37.4 °C) (Tympanic) O2 sat- 97%              --    Please note that portions of this were completed with a voice recognition program.       Note Disclaimer: At [facility], we believe that sharing information builds trust and better relationships. You are receiving this note because you are receiving care at [facility] or recently visited. It is possible you will see health information before a provider has talked with you about it. This kind of information can be easy to misunderstand. To help you fully understand  what it means for your health, we urge you to discuss this note with your provider.             Nick Corral MD  23 0513       Nick Corral MD  23 0546      Electronically signed by Nick Corral MD at 23 0524       Shy Valerio, RN at 23 0316          Pt to triage via ems from home  Ems called for overdose/unresponsive upon arrival pt was responsive after her boyfriend administered narcan. Pt reports she took xanax. Pt had a bowel movement en route. Pt is a/ox4. Pt is restless/anxious in triage.     Electronically signed by Shy Valerio, RN at 23 0322          Physician Progress Notes (last 72 hours)        Dillon Kaur MD at 23 4360              Name: Nereida Thomas ADMIT: 2023   : 1995  PCP: Provider, No Known    MRN: [id number] LOS: 1 days   AGE/SEX: 27 y.o. female  ROOM: [facility]     Subjective   Subjective   Patient is lying in the bed and does not appear to be any major distress.  Denies nausea, vomiting, abdominal pain, chest pain.  She is much more awake and alert and answering questions quite well.  Denies any suicidal ideation or intention.      Objective   Objective   Vital Signs  Temp:  [97.5 °F (36.4 °C)-99.5 °F (37.5 °C)] 99.1 °F (37.3 °C)  Heart Rate:  [] 111  Resp:  [16-22] 16  BP: (115-132)/(76-94) 117/76  SpO2:  [97 %-100 %] 100 %  on   ;   Device (Oxygen Therapy): room air  Body mass index is 20.39 kg/m².  Physical Exam  HEENT: PERRLA, extraocular movements intact, Scleras no icterus  Neck: Supple, no JVD  Cardiovascular: Regular rate and rhythm with normal S1 and S2  Respiratory: Fairly clear to auscultation bilaterally with no wheezes  GI: Soft, nontender, bowel sounds present  Extremities: No edema, palpable pedal pulses  Neurologic: Grossly nonfocal, no facial asymmetry    Results Review     I reviewed the patient's new clinical results.  Results from last 7 days   Lab Units 23  0529 23  0826  07/21/23  0346   WBC 10*3/mm3 7.75 15.04* 10.31   HEMOGLOBIN g/dL 12.7 14.3 15.3   PLATELETS 10*3/mm3 270 330 391     Results from last 7 days   Lab Units 07/22/23  0529 07/21/23  0826 07/21/23  0346   SODIUM mmol/L 138 142 141   POTASSIUM mmol/L 3.7 3.6 3.8   CHLORIDE mmol/L 105 103 99   CO2 mmol/L 18.9* 24.1 20.0*   BUN mg/dL 17 16 14   CREATININE mg/dL 0.50* 0.71 0.82   GLUCOSE mg/dL 80 123* 96   EGFR mL/min/1.73 132.8 120.4 101.3     Results from last 7 days   Lab Units 07/21/23  0346   ALBUMIN g/dL 5.8*   BILIRUBIN mg/dL 0.4   ALK PHOS U/L 95   AST (SGOT) U/L 41*   ALT (SGPT) U/L 28     Results from last 7 days   Lab Units 07/22/23  0529 07/21/23  0826 07/21/23  0346   CALCIUM mg/dL 9.0 10.2 11.5*   ALBUMIN g/dL  --   --  5.8*     Results from last 7 days   Lab Units 07/21/23  0826   PROCALCITONIN ng/mL 0.20     No results found for: HGBA1C, POCGLU    CT Head Without Contrast    Result Date: 7/21/2023  Electronically signed by Ryan Valdovinos MD on 07-21-23 at 0508    XR Chest 1 View    Result Date: 7/21/2023  Negative chest radiograph.  This report was finalized on 7/21/2023 3:13 PM by Dr. Fawad Owen M.D.     I have personally reviewed all medications:  Scheduled Medications  sodium chloride, 10 mL, Intravenous, Q12H    Infusions  sodium chloride, 100 mL/hr, Last Rate: 100 mL/hr (07/21/23 1215)    Diet  Diet: Regular/House Diet; Texture: Regular Texture (IDDSI 7); Fluid Consistency: Thin (IDDSI 0)    I have personally reviewed:  [x]  Laboratory   [x]  Microbiology   [x]  Radiology   [x]  EKG/Telemetry  [x]  Cardiology/Vascular   []  Pathology    []  Records      Assessment/Plan     Active Hospital Problems    Diagnosis  POA    **AMS (altered mental status) [R41.82]  Yes    Polysubstance abuse [F19.10]  Yes    Drug overdose [T50.901A]  Yes    Anxiety [F41.9]  Yes    PTSD (post-traumatic stress disorder) [F43.10]  Yes    Metabolic acidosis, increased anion gap [E87.29]  Yes    Hypercalcemia [E83.52]   Yes    Leukocytosis [D72.829]  Unknown      Resolved Hospital Problems   No resolved problems to display.       27 y.o. female admitted with AMS (altered mental status).    1.Polysubstance abuse/drug overdose, appears to be a recreational overdose and currently denies any suicidal ideation or intention.  Axis consult is also been obtained.  2.  Leukocytosis, most likely reactive finding and WBC is now back to normal and patient is afebrile.  Chest x-ray is negative.  3.  Metabolic acidosis, this has now resolved.  4.  On SCDs for DVT prophylaxis.  5.  CODE STATUS is full code.      Dillon Kaur MD  [facility]  07/22/23  15:56 EDT        Electronically signed by Dillon Kaur MD at 07/22/23 1558          Consult Notes (last 72 hours)        Horace Jolly III, MD at 07/22/23 1604        Consult Orders    1. Inpatient Psychiatrist Consult [[id number]] ordered by Mirna Forman, MARTA at 07/21/23 0911                 IDENTIFYING INFORMATION: The patient is a 27-year-old white female admitted after having been found unresponsive by her boyfriend.    CHIEF COMPLAINT: None given    INFORMANT: Patient, boyfriend and chart    RELIABILITY: Fair    HISTORY OF PRESENT ILLNESS: The patient is a 27-year-old white female admitted after having been found down by her boyfriend.  Boyfriend has reported that the patient has a history of recreational Xanax used and has also used opiates in the past secondary to a history of chronic neck and back pain.  The patient vehemently denies that this was a suicide attempt.  She did not write a suicide note and denies involvement of alcohol.  She reports that she thought she was taking Vistaril but in fact took another substance.  Her drug screen was positive for cocaine and fentanyl.  Both patient and boyfriend vehemently denies that the patient was attempting suicide and both are comfortable with discharge home once medically stable.    PAST  PSYCHIATRIC HISTORY: The patient reports a history of psychiatric treatment while living in [place].  She is not currently seen by a psychiatrist however.  In addition to her substance use history she reportedly has a history of depression and PTSD.    PAST MEDICAL HISTORY: Noncontributory    MEDICATIONS:   Current Facility-Administered Medications   Medication Dose Route Frequency Provider Last Rate Last Admin    calcium carbonate (TUMS) chewable tablet 500 mg (200 mg elemental)  2 tablet Oral BID PRN Jaylin Massey APRN        cloNIDine (CATAPRES) tablet 0.1 mg  0.1 mg Oral 4x Daily PRN Jaylin Massey APRN   0.1 mg at 07/22/23 1444    Followed by    [START ON 7/23/2023] cloNIDine (CATAPRES) tablet 0.1 mg  0.1 mg Oral TID PRN Jaylin Massey APRN        Followed by    [START ON 7/24/2023] cloNIDine (CATAPRES) tablet 0.1 mg  0.1 mg Oral BID PRN Jaylin Massey APRN        Followed by    [START ON 7/25/2023] cloNIDine (CATAPRES) tablet 0.1 mg  0.1 mg Oral Once PRN Jaylin Massey APRN        hydrOXYzine (ATARAX) tablet 50 mg  50 mg Oral TID PRN Jaylin Massey APRN   50 mg at 07/22/23 1445    nitroglycerin (NITROSTAT) SL tablet 0.4 mg  0.4 mg Sublingual Q5 Min PRN Jaylin Massey APRN        ondansetron (ZOFRAN) tablet 4 mg  4 mg Oral Q6H PRN Jaylin Massey APRN   4 mg at 07/21/23 0833    Or    ondansetron (ZOFRAN) injection 4 mg  4 mg Intravenous Q6H PRN Jaylin Massey APRN        sodium chloride 0.9 % flush 10 mL  10 mL Intravenous PRN Nick Corral MD        sodium chloride 0.9 % flush 10 mL  10 mL Intravenous Q12H Jaylin Massey APRN   10 mL at 07/22/23 0943    sodium chloride 0.9 % flush 10 mL  10 mL Intravenous PRN Jaylin Massey APRN        sodium chloride 0.9 % infusion 40 mL  40 mL Intravenous PRN Jaylin Massey APRN             ALLERGIES: None    FAMILY HISTORY: Noncontributory    SOCIAL HISTORY: The patient lives with her  boyfriend of 7 years.  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  Her substance use history as described previously.  She denies any history of intravenous drug use    MENTAL STATUS EXAM: Patient is a well-developed well-nourished white female appearing her stated age.  She is in no apparent physical distress at the time of examination.  She is awake alert and oriented all spheres.  Her mood is euthymic her affect congruent.  Speech is relevant and coherent.  There are no deficits memory or cognition noted.  Intelligence is judged to be in the average range based on fund of knowledge, the patient is cooperative throughout the interview.  She denies current suicidal or homicidal ideation or psychotic features.  Judgement and insight are intact.    ASSETS/LIABILITIES: Reported boyfriend/ongoing substance use    DIAGNOSTIC IMPRESSION: Opioid use disorder cocaine use disorder sedative-hypnotic use disorder posttraumatic stress disorder by history, status post accidental ingestion    PLAN: The patient vehemently denies any suicidal intent in her ingestion and I suspect that she was probably given a pill laced with fentanyl which precipitated this admission.  She does expressed interest in resuming psychiatric and substance abuse treatment once discharged.  I will ask the access center to see her regarding this.  In the absence of suicidal ideation I have discontinued her sitter and suicide precautions and from a psychiatric standpoint she is stable for discharge at any time.  I will sign off.    Electronically signed by Horace Jolly III, MD at 07/22/23 8891       Anne-Marie Young LPCC at 07/22/23 1110        Consult Orders    1. Inpatient Access Center Consult [[id number]] ordered by Mirna Forman APRN at 07/21/23 0911                 ACCESS Center consult for drugs. Pt also on SI precautions w/ sitter d/t possible intentional OD. Pt's fiance found pt unresponsive in bed yesterday, gave pt  "narcan at home. EMS transported pt to ED, pt restless/anxious/confusion in triage. BAL negative. UDS positive for cocaine and fentanyl. Fiance states pt has recently been getting xanax from coworker and mixing with opiates (old prescription pain killers) and recreationally uses cocaine. Last COWS 2 @0745. RN reports pt wanting to leave AMA this morning and fiance combative with RN wanting pt to leave AMA as well. RN advised pt on SI precautions and needing to wait for evaluation by ACCESS/psychiatrist. RN reports dynamic between pt and fiance \"off\" and receiving conflicting stories.     Pt asleep w/ fiance in bed upon entry, sitter present. This writer explained role and requested fiance to leave the room for privacy, which he did (this writer spoke with fiance outside room afterwards, responses added throughout below). Sitter remained presented. Pt A&Ox4. Pt fatigued w/ visible tremors and sweating, speech slurred words and lag in response. Pt provided little to no eye contact, guarded and extremely withdrawn. Pt having poor judgement and insight. Pt focused on discharge. Pt mood is somewhat flat and ambivalent, likely underreported anxiety and depression as pt rates both as a \"4/10\" (10 being the worst on both scales. Pt denies SI/HI, hallucinations or wish to be dead at this time. Pt reports difficulty w/ sleep and appetite \"I have to force myself to eat, I only sleep 1.5 hours a night because of my anxiety and trauma hx\". Current stressors: \"work piling up on my, having flashbacks of my trauma hearing stories of patients at work talk about their own trauma, never put myself 1st, sometimes feel worthless\". Pt reports to this writer she thought she had obtained \"depression meds\" from coworker \"nothing like xanax\". Fiance indicates pt and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

## 2023-07-24 NOTE — PROGRESS NOTES
Case Management Discharge Note      Final Note: Pt left AMA         Selected Continued Care - Discharged on 7/22/2023 Admission date: 7/21/2023 - Discharge disposition: Left Against Medical Advice      Destination    No services have been selected for the patient.                Durable Medical Equipment    No services have been selected for the patient.                Dialysis/Infusion    No services have been selected for the patient.                Home Medical Care    No services have been selected for the patient.                Therapy    No services have been selected for the patient.                Community Resources    No services have been selected for the patient.                Community & DME    No services have been selected for the patient.                         Final Discharge Disposition Code: 07 - left AMA